# Patient Record
Sex: FEMALE | Race: WHITE | Employment: UNEMPLOYED | URBAN - METROPOLITAN AREA
[De-identification: names, ages, dates, MRNs, and addresses within clinical notes are randomized per-mention and may not be internally consistent; named-entity substitution may affect disease eponyms.]

---

## 2017-01-30 ENCOUNTER — PATIENT OUTREACH (OUTPATIENT)
Dept: FAMILY MEDICINE CLINIC | Age: 27
End: 2017-01-30

## 2017-01-30 NOTE — PROGRESS NOTES
[address]  [phone]  Fax [phone]    Patient Name: Jerrell Bennett  YOB: 1990    Patient listed on High Risk Report. Called POA, Mother, Ms Angelo Vaca who updated NN that Ms Yanci Lugo is now located at a group home called [facility] in [place], [place]. Able to confirm that patient is a resident. Left message for group home staff member, Elisabeth Montiel to return call to confirm PCP.

## 2017-03-08 ENCOUNTER — HOSPITAL ENCOUNTER (OUTPATIENT)
Dept: GENERAL RADIOLOGY | Age: 27
Discharge: HOME | End: 2017-03-08
Payer: MEDICARE

## 2017-03-08 ENCOUNTER — OFFICE VISIT (OUTPATIENT)
Dept: SURGERY | Age: 27
End: 2017-03-08

## 2017-03-08 VITALS
WEIGHT: 137 LBS | OXYGEN SATURATION: 98 % | DIASTOLIC BLOOD PRESSURE: 91 MMHG | SYSTOLIC BLOOD PRESSURE: 123 MMHG | HEART RATE: 84 BPM | BODY MASS INDEX: 23.39 KG/M2 | HEIGHT: 64 IN

## 2017-03-08 DIAGNOSIS — Z01.818 PRE-OP TESTING: ICD-10-CM

## 2017-03-08 DIAGNOSIS — G40.219 LOCALIZATION-RELATED SYMPTOMATIC EPILEPSY AND EPILEPTIC SYNDROMES WITH COMPLEX PARTIAL SEIZURES, INTRACTABLE, WITHOUT STATUS EPILEPTICUS (HCC): Primary | ICD-10-CM

## 2017-03-08 PROCEDURE — 71010 XR CHEST SNGL V: CPT

## 2017-03-08 RX ORDER — BACITRACIN 500 [USP'U]/G
OINTMENT TOPICAL 3 TIMES DAILY
COMMUNITY

## 2017-03-08 RX ORDER — DIPHENHYDRAMINE HCL 25 MG
25 TABLET ORAL
COMMUNITY

## 2017-03-08 RX ORDER — DIAZEPAM 5 MG/1
10 TABLET ORAL
COMMUNITY
End: 2019-05-29

## 2017-03-08 RX ORDER — CHOLECALCIFEROL (VITAMIN D3) 125 MCG
5000 CAPSULE ORAL DAILY
COMMUNITY

## 2017-03-08 RX ORDER — VALPROIC ACID 250 MG/1
CAPSULE, LIQUID FILLED ORAL 3 TIMES DAILY
COMMUNITY
End: 2019-05-29

## 2017-03-08 RX ORDER — AMOXICILLIN 875 MG/1
875 TABLET, FILM COATED ORAL 2 TIMES DAILY
COMMUNITY
End: 2019-05-29

## 2017-03-08 RX ORDER — AMLODIPINE BESYLATE 2.5 MG/1
2.5 TABLET ORAL DAILY
COMMUNITY

## 2017-03-08 RX ORDER — PROMETHAZINE HYDROCHLORIDE 25 MG/1
SUPPOSITORY RECTAL
COMMUNITY
End: 2019-05-29

## 2017-03-08 NOTE — PROGRESS NOTES
The patient is a 32 y.o. woman who lives in a group home and has intractable partial complex seizure disorder. She has an existing vagus nerve stimulator which is reported to have reached end of battery life. The patient presents for evaluation for replacement of vagus nerve stimulator and possibly replacement of lead. The patient is accompanied by a caretaker from her group home. The patient herself has severe intellectual disability and is not able to answer questions. The patient's reported past medical history includes arthritis, autism disorder, hypertension, ovarian cyst.  The patient has never smoked. She does not use alcohol. The patient is reported to have a gastrostomy tube in place which is for the most part not used. The patient is said to be able to eat food and drink liquid. The patient is not able to give any ROS. There is no history of heart disease, lung disease or diabetes mellitus. Physical Examination:   GENERAL:  Alert woman in a wheelchair in no acute distress. VITAL SIGNS:  BP:  122/91. P: 84.  O2 saturation 99% on room air. WT: 137 lb. HEAD/NECK:  No jaundice, mass or thyromegaly. In the left neck there is a scar which is well healed. At the level of the left anterior axillary line, there is a healed scar. LUNGS:  Clear bilaterally without wheeze, rhonchi or rales. Mariella Barrio HEART:  Regular without murmur, gallop or rub. NEURO:  Patient is alert but not able to answer questions regarding orientation. She appears to move all extremities. Facial movement is symmetrical.  She does not speak. I reviewed records from the patient's neurologist who is Dr. Alma Casas. I reviewed with the patient's caretaker who is present the indications for surgery, risks vs benefits of the surgery. I will speak to the patient's health care power of  regarding surgery, its risks and benefits in order to obtain informed consent.   I would anticipate the surgery would be done as an outpatient. Final Diagnosis:  Intractable partial complex seizure disorder. MedDATA/gwo     cc: Stacey Shaikh MD        Total Evaluation and Management time utilized (excluding any procedure time)  was 30 minutes, with 55 % in counseling and/or coordination of care.     Debbi Oconnell MD

## 2017-03-08 NOTE — MR AVS SNAPSHOT
Visit Information Date & Time Provider Department Dept. Phone Encounter #  
 3/8/2017 10:40 AM Lennox Hoist, MD [facility] [address] [phone] [id number] Upcoming Health Maintenance Date Due  
 HPV AGE 9Y-34Y (1 of 3 - Female 3 Dose Series) 10/2/2001 INFLUENZA AGE 9 TO ADULT 8/1/2016 PAP AKA CERVICAL CYTOLOGY 8/4/2017 DTaP/Tdap/Td series (2 - Td) 9/22/2024 Allergies as of 3/8/2017  Review Complete On: 3/8/2017 By: Angélica Gant LPN No Known Allergies Current Immunizations  Reviewed on 11/12/2015 Name Date Influenza Vaccine 10/9/2015, 10/17/2014 Pneumococcal Polysaccharide (PPSV-23) 11/12/2015 TB Skin Test (PPD) Intradermal 10/6/2015, 9/22/2014, 9/17/2013 Tdap 9/22/2014 Not reviewed this visit You Were Diagnosed With   
  
 Codes Comments Pre-op testing    -  Primary ICD-10-CM: M11.061 ICD-9-CM: V72.84 Vitals BP Pulse Height(growth percentile) Weight(growth percentile) SpO2 BMI  
 (!) 123/91 84 5' 4\" (1.626 m) 137 lb (62.1 kg) 98% 23.52 kg/m2 OB Status Smoking Status Injection Never Smoker BMI and BSA Data Body Mass Index Body Surface Area  
 23.52 kg/m 2 1.67 m 2 Preferred Pharmacy Pharmacy Name Phone [facility] [address] [place] [phone] Your Updated Medication List  
  
   
This list is accurate as of: 3/8/17 12:13 PM.  Always use your most recent med list.  
  
  
  
  
 acetaminophen 325 mg tablet Commonly known as:  TYLENOL Take 650 mg by mouth every four (4) hours as needed for Pain. amLODIPine 2.5 mg tablet Commonly known as:  Angie Rell Take  by mouth daily. amoxicillin 875 mg tablet Commonly known as:  AMOXIL Take 875 mg by mouth two (2) times a day. amoxicillin-pot clavula (bulk) 4:1 Powd  
by Does Not Apply route. bacitracin 500 unit/gram Oint Commonly known as:  BACITRACIN Apply  to affected area three (3) times daily. CHLORASEPTIC MAX 15-10 mg Lozg lozenge Generic drug:  benzocaine-menthol Take 1 Lozenge by mouth every two (2) hours as needed for Sore throat. clonazePAM 2 mg tablet Commonly known as:  Willeen Narrow Take 1.5 Tabs by mouth two (2) times a day. Max Daily Amount: 6 mg. At 08:00 &1600  
  
 diazePAM 5 mg tablet Commonly known as:  VALIUM Take 10 mg by mouth every six (6) hours as needed (SEIZURES). diphenhydrAMINE 25 mg tablet Commonly known as:  BENADRYL Take 25 mg by mouth every six (6) hours as needed for Sleep.  
  
 docusate sodium 100 mg capsule Commonly known as:  Esther Dilling Take 100 mg by mouth daily as needed. enoxaparin 40 mg/0.4 mL Commonly known as:  LOVENOX  
0.4 mL by SubCUTAneous route every twenty-four (24) hours. famotidine 20 mg tablet Commonly known as:  PEPCID Take 1 Tab by mouth two (2) times a day. hydroxypropyl methylcellulose 0.5 % ophthalmic solution Commonly known as:  ISOPTO TEARS Administer 1 Drop to both eyes as needed. ibuprofen 200 mg tablet Commonly known as:  MOTRIN Take 2 Tabs by mouth every six (6) hours as needed for Pain. IMODIUM A-D PO Take 2 mg by mouth as needed (2 TABLETS AFTER FIRST STOOL AND 1 TABLET AFTER NEXT BM DONT EXCEED 16 MG IN 24 HR). lacosamide 200 mg Tab tablet Commonly known as:  VIMPAT Take 1 Tab by mouth two (2) times a day. Max Daily Amount: 400 mg.  
  
 medroxyPROGESTERone 150 mg/mL Syrg Commonly known as:  DEPO-PROVERA  
use as directed at physician's office every 3 months MIDOL MAX ST MENSTRUAL 500-60-15 mg Tab Generic drug:  acetaminophen-caff-pyrilamine Take  by mouth.  
  
 modafinil 200 mg tablet Commonly known as:  PROVIGIL Take 1 Tab by mouth daily. Max Daily Amount: 200 mg. Indications: did not tolerate Ritalin. Sleepiness due to medications needed for seizure disorder nystatin powder Commonly known as:  MYCOSTATIN Apply  to affected area three (3) times daily. promethazine 25 mg suppository Commonly known as:  PHENERGAN Insert  into rectum every six (6) hours as needed for Nausea. scopolamine 1.5 mg (1 mg over 3 days) Pt3d Commonly known as:  TRANSDERM-SCOP  
1 Patch by TransDERmal route every seventy-two (72) hours. valproic acid (as sodium salt) 250 mg/5 mL (5 mL) Soln oral solution Commonly known as:  DEPAKENE  
10 mL by Per G Tube route every eight (8) hours. Indications: EPILEPSY  
  
 valproic acid 250 mg capsule Commonly known as:  Elva [place] Take  by mouth three (3) times daily. VITAMIN D3 2,000 unit Tab Generic drug:  cholecalciferol (vitamin D3) Take 5,000 Units by mouth daily. To-Do List   
 03/15/2017 Imaging:  XR CHEST SNGL V Introducing \[facility] [place] [facility]\"" & [facility] SERVICES! Dear Tonya Dunn: Thank you for requesting a [employer] account. Our records indicate that you already have an active [employer] account. You can access your account anytime at [email or web address] Did you know that you can access your hospital and ER discharge instructions at any time in [employer]? You can also review all of your test results from your hospital stay or ER visit. Additional Information If you have questions, please visit the Frequently Asked Questions section of the [employer] website at [email or web address] Remember, Transactiv is NOT to be used for urgent needs. For medical emergencies, dial 911. Now available from your iPhone and Android! Please provide this summary of care documentation to your next provider. Your primary care clinician is listed as Mayte Valencia. If you have any questions after today's visit, please call [phone].

## 2017-03-09 ENCOUNTER — TELEPHONE (OUTPATIENT)
Dept: SURGERY | Age: 27
End: 2017-03-09

## 2017-03-09 PROBLEM — G40.219 LOCALIZATION-RELATED SYMPTOMATIC EPILEPSY AND EPILEPTIC SYNDROMES WITH COMPLEX PARTIAL SEIZURES, INTRACTABLE, WITHOUT STATUS EPILEPTICUS (HCC): Status: ACTIVE | Noted: 2017-03-09

## 2017-03-15 RX ORDER — ADHESIVE BANDAGE
30 BANDAGE TOPICAL DAILY PRN
COMMUNITY

## 2017-03-15 RX ORDER — FERROUS SULFATE, DRIED 160(50) MG
1 TABLET, EXTENDED RELEASE ORAL 2 TIMES DAILY WITH MEALS
COMMUNITY
End: 2019-05-29

## 2017-03-15 NOTE — PERIOP NOTES
[facility]  Preoperative Instructions        Surgery Date 3/17/17          Time of Arrival 0930    1. On the day of your surgery, please report to the Surgical Services Registration Desk and sign in at your designated time. The Surgery Center is located to the right of the Emergency Room. 2. You must have someone with you to drive you home. You should not drive a car for 24 hours following surgery. Please make arrangements for a friend or family member to stay with you for the first 24 hours after your surgery. 3. Do not have anything to eat or drink (including water, gum, mints, coffee, juice) after midnight 3/16/17              . This may not apply to medications prescribed by your physician. Please note special instructions, if applicable. If you are currently taking Plavix, Coumadin, or other blood-thinning agents, contact your surgeon for instructions. 4. We recommend you do not drink any alcoholic beverages for 24 hours before and after your surgery. 5. Have a list of all current medications, including vitamins, herbal supplements and any other over the counter medications. Stop all Aspirin and non-steroidal anti-inflammatory drugs (I.e. Advil, Aleve), as directed by your surgeon's office. Stop all vitamins and herbal supplements seven days prior to your surgery. 6. Wear comfortable clothes. Wear glasses instead of contacts. Do not bring any money or jewelry. Please bring picture ID, insurance card, and any prearranged co-payment or hospital payment. Do not wear make-up, particularly mascara the morning of your surgery. Do not wear nail polish, particularly if you are having foot /hand surgery. Wear your hair loose or down, no ponytails, buns, jesus pins or clips. All body piercings must be removed.   Please shower with antibacterial soap for three consecutive days before and on the morning of surgery, but do not apply any lotions, powders or deodorants after the shower on the day of surgery. Please use a fresh towels after each shower. Please sleep in clean clothes and change bed linens the night before surgery. Please do not shave for 48 hours prior to surgery. Shaving of the face is acceptable. 7. You should understand that if you do not follow these instructions your surgery may be cancelled. If your physical condition changes (I.e. fever, cold or flu) please contact your surgeon as soon as possible. 8. It is important that you be on time. If a situation occurs where you may be late, please call [phone] (OR Holding Area). 9. If you have any questions and or problems, please call [phone] (Pre-admission Testing). 10. Your surgery time may be subject to change. You will receive a phone call the evening prior if your time changes. 11.  If having outpatient surgery, you must have someone to drive you here, stay with you during the duration of your stay, and to drive you home at time of discharge. Special Instructions:Valium if needed. Immodium if needed. MEDICATIONS TO TAKE THE MORNING OF SURGERY WITH A SIP OF WATER:Norvasc,Depakene, Klonopin,Pepcid, Vimpat      I understand a pre-operative phone call will be made to verify my surgery time. In the event that I am not available, I give permission for a message to be left on my answering service and/or with another person?   Yes Jaylon Query @ [facility] [phone]         ___________________      __________   _________    Alivia Tovar of Patient)             (Witness)                (Date and Time)

## 2017-03-17 ENCOUNTER — ANESTHESIA EVENT (OUTPATIENT)
Dept: SURGERY | Age: 27
End: 2017-03-17
Payer: MEDICARE

## 2017-03-17 ENCOUNTER — ANESTHESIA (OUTPATIENT)
Dept: SURGERY | Age: 27
End: 2017-03-17
Payer: MEDICARE

## 2017-03-17 ENCOUNTER — HOSPITAL ENCOUNTER (OUTPATIENT)
Age: 27
Setting detail: OUTPATIENT SURGERY
Discharge: HOME | End: 2017-03-17
Attending: SURGERY | Admitting: SURGERY
Payer: MEDICARE

## 2017-03-17 VITALS
HEIGHT: 66 IN | WEIGHT: 136.47 LBS | OXYGEN SATURATION: 100 % | DIASTOLIC BLOOD PRESSURE: 61 MMHG | SYSTOLIC BLOOD PRESSURE: 120 MMHG | BODY MASS INDEX: 21.93 KG/M2 | TEMPERATURE: 97.5 F | RESPIRATION RATE: 18 BRPM | HEART RATE: 84 BPM

## 2017-03-17 LAB — HCG UR QL: NEGATIVE

## 2017-03-17 PROCEDURE — 77030020788: Performed by: SURGERY

## 2017-03-17 PROCEDURE — 74011250636 HC RX REV CODE- 250/636: Performed by: ANESTHESIOLOGY

## 2017-03-17 PROCEDURE — 77030020256 HC SOL INJ NACL 0.9%  500ML: Performed by: SURGERY

## 2017-03-17 PROCEDURE — C1767 GENERATOR, NEURO NON-RECHARG: HCPCS | Performed by: SURGERY

## 2017-03-17 PROCEDURE — 74011000250 HC RX REV CODE- 250

## 2017-03-17 PROCEDURE — 77030032490 HC SLV COMPR SCD KNE COVD -B: Performed by: SURGERY

## 2017-03-17 PROCEDURE — 77030002933 HC SUT MCRYL J&J -A: Performed by: SURGERY

## 2017-03-17 PROCEDURE — 77030011640 HC PAD GRND REM COVD -A: Performed by: SURGERY

## 2017-03-17 PROCEDURE — 77030002996 HC SUT SLK J&J -A: Performed by: SURGERY

## 2017-03-17 PROCEDURE — 76210000006 HC OR PH I REC 0.5 TO 1 HR: Performed by: SURGERY

## 2017-03-17 PROCEDURE — 77030002986 HC SUT PROL J&J -A: Performed by: SURGERY

## 2017-03-17 PROCEDURE — 77030010507 HC ADH SKN DERMBND J&J -B: Performed by: SURGERY

## 2017-03-17 PROCEDURE — 77030008684 HC TU ET CUF COVD -B: Performed by: ANESTHESIOLOGY

## 2017-03-17 PROCEDURE — 77030008467 HC STPLR SKN COVD -B: Performed by: SURGERY

## 2017-03-17 PROCEDURE — 77030026438 HC STYL ET INTUB CARD -A: Performed by: ANESTHESIOLOGY

## 2017-03-17 PROCEDURE — 74011000250 HC RX REV CODE- 250: Performed by: SURGERY

## 2017-03-17 PROCEDURE — 76060000033 HC ANESTHESIA 1 TO 1.5 HR: Performed by: SURGERY

## 2017-03-17 PROCEDURE — 76210000020 HC REC RM PH II FIRST 0.5 HR: Performed by: SURGERY

## 2017-03-17 PROCEDURE — 74011250636 HC RX REV CODE- 250/636

## 2017-03-17 PROCEDURE — 77030020782 HC GWN BAIR PAWS FLX 3M -B

## 2017-03-17 PROCEDURE — 76010000149 HC OR TIME 1 TO 1.5 HR: Performed by: SURGERY

## 2017-03-17 PROCEDURE — 74011000272 HC RX REV CODE- 272: Performed by: SURGERY

## 2017-03-17 PROCEDURE — 81025 URINE PREGNANCY TEST: CPT

## 2017-03-17 PROCEDURE — 77030010938 HC CLP LIG TELE -A: Performed by: SURGERY

## 2017-03-17 PROCEDURE — 77030002888 HC SUT CHRMC J&J -A: Performed by: SURGERY

## 2017-03-17 DEVICE — IMPLANTABLE PULSE GENERATOR
Type: IMPLANTABLE DEVICE | Site: CHEST  WALL | Status: NON-FUNCTIONAL
Brand: VNS THERAPY® ASPIRESR® MODEL 106 GENERATOR
Removed: 2019-05-31

## 2017-03-17 RX ORDER — SUCCINYLCHOLINE CHLORIDE 20 MG/ML
INJECTION INTRAMUSCULAR; INTRAVENOUS AS NEEDED
Status: DISCONTINUED | OUTPATIENT
Start: 2017-03-17 | End: 2017-03-17 | Stop reason: HOSPADM

## 2017-03-17 RX ORDER — SODIUM CHLORIDE, SODIUM LACTATE, POTASSIUM CHLORIDE, CALCIUM CHLORIDE 600; 310; 30; 20 MG/100ML; MG/100ML; MG/100ML; MG/100ML
100 INJECTION, SOLUTION INTRAVENOUS CONTINUOUS
Status: DISCONTINUED | OUTPATIENT
Start: 2017-03-17 | End: 2017-03-17 | Stop reason: HOSPADM

## 2017-03-17 RX ORDER — ONDANSETRON 2 MG/ML
4 INJECTION INTRAMUSCULAR; INTRAVENOUS AS NEEDED
Status: DISCONTINUED | OUTPATIENT
Start: 2017-03-17 | End: 2017-03-17 | Stop reason: HOSPADM

## 2017-03-17 RX ORDER — ONDANSETRON 2 MG/ML
INJECTION INTRAMUSCULAR; INTRAVENOUS AS NEEDED
Status: DISCONTINUED | OUTPATIENT
Start: 2017-03-17 | End: 2017-03-17 | Stop reason: HOSPADM

## 2017-03-17 RX ORDER — MIDAZOLAM HYDROCHLORIDE 1 MG/ML
0.5 INJECTION, SOLUTION INTRAMUSCULAR; INTRAVENOUS
Status: DISCONTINUED | OUTPATIENT
Start: 2017-03-17 | End: 2017-03-17 | Stop reason: HOSPADM

## 2017-03-17 RX ORDER — CEFAZOLIN SODIUM IN 0.9 % NACL 2 G/100 ML
2 PLASTIC BAG, INJECTION (ML) INTRAVENOUS ONCE
Status: DISCONTINUED | OUTPATIENT
Start: 2017-03-17 | End: 2017-03-17 | Stop reason: HOSPADM

## 2017-03-17 RX ORDER — HYDROCODONE BITARTRATE AND ACETAMINOPHEN 5; 325 MG/1; MG/1
1-2 TABLET ORAL
Qty: 20 TAB | Refills: 0 | Status: SHIPPED | OUTPATIENT
Start: 2017-03-17 | End: 2019-05-29

## 2017-03-17 RX ORDER — PROPOFOL 10 MG/ML
INJECTION, EMULSION INTRAVENOUS AS NEEDED
Status: DISCONTINUED | OUTPATIENT
Start: 2017-03-17 | End: 2017-03-17 | Stop reason: HOSPADM

## 2017-03-17 RX ORDER — SODIUM CHLORIDE, SODIUM LACTATE, POTASSIUM CHLORIDE, CALCIUM CHLORIDE 600; 310; 30; 20 MG/100ML; MG/100ML; MG/100ML; MG/100ML
25 INJECTION, SOLUTION INTRAVENOUS CONTINUOUS
Status: DISCONTINUED | OUTPATIENT
Start: 2017-03-17 | End: 2017-03-17 | Stop reason: HOSPADM

## 2017-03-17 RX ORDER — LIDOCAINE HYDROCHLORIDE 10 MG/ML
0.1 INJECTION, SOLUTION EPIDURAL; INFILTRATION; INTRACAUDAL; PERINEURAL AS NEEDED
Status: DISCONTINUED | OUTPATIENT
Start: 2017-03-17 | End: 2017-03-17 | Stop reason: HOSPADM

## 2017-03-17 RX ORDER — FENTANYL CITRATE 50 UG/ML
INJECTION, SOLUTION INTRAMUSCULAR; INTRAVENOUS AS NEEDED
Status: DISCONTINUED | OUTPATIENT
Start: 2017-03-17 | End: 2017-03-17 | Stop reason: HOSPADM

## 2017-03-17 RX ORDER — FENTANYL CITRATE 50 UG/ML
25 INJECTION, SOLUTION INTRAMUSCULAR; INTRAVENOUS
Status: DISCONTINUED | OUTPATIENT
Start: 2017-03-17 | End: 2017-03-17 | Stop reason: HOSPADM

## 2017-03-17 RX ORDER — MIDAZOLAM HYDROCHLORIDE 1 MG/ML
1 INJECTION, SOLUTION INTRAMUSCULAR; INTRAVENOUS AS NEEDED
Status: DISCONTINUED | OUTPATIENT
Start: 2017-03-17 | End: 2017-03-17 | Stop reason: HOSPADM

## 2017-03-17 RX ORDER — LIDOCAINE HYDROCHLORIDE 20 MG/ML
INJECTION, SOLUTION EPIDURAL; INFILTRATION; INTRACAUDAL; PERINEURAL AS NEEDED
Status: DISCONTINUED | OUTPATIENT
Start: 2017-03-17 | End: 2017-03-17 | Stop reason: HOSPADM

## 2017-03-17 RX ORDER — MIDAZOLAM HYDROCHLORIDE 1 MG/ML
INJECTION, SOLUTION INTRAMUSCULAR; INTRAVENOUS AS NEEDED
Status: DISCONTINUED | OUTPATIENT
Start: 2017-03-17 | End: 2017-03-17 | Stop reason: HOSPADM

## 2017-03-17 RX ORDER — HYDROCODONE BITARTRATE AND ACETAMINOPHEN 5; 325 MG/1; MG/1
1 TABLET ORAL AS NEEDED
Status: DISCONTINUED | OUTPATIENT
Start: 2017-03-17 | End: 2017-03-17 | Stop reason: HOSPADM

## 2017-03-17 RX ORDER — HYDROMORPHONE HYDROCHLORIDE 1 MG/ML
0.5 INJECTION, SOLUTION INTRAMUSCULAR; INTRAVENOUS; SUBCUTANEOUS
Status: DISCONTINUED | OUTPATIENT
Start: 2017-03-17 | End: 2017-03-17 | Stop reason: HOSPADM

## 2017-03-17 RX ORDER — DIPHENHYDRAMINE HYDROCHLORIDE 50 MG/ML
12.5 INJECTION, SOLUTION INTRAMUSCULAR; INTRAVENOUS AS NEEDED
Status: DISCONTINUED | OUTPATIENT
Start: 2017-03-17 | End: 2017-03-17 | Stop reason: HOSPADM

## 2017-03-17 RX ORDER — FENTANYL CITRATE 50 UG/ML
50 INJECTION, SOLUTION INTRAMUSCULAR; INTRAVENOUS AS NEEDED
Status: DISCONTINUED | OUTPATIENT
Start: 2017-03-17 | End: 2017-03-17 | Stop reason: HOSPADM

## 2017-03-17 RX ADMIN — LIDOCAINE HYDROCHLORIDE 40 MG: 20 INJECTION, SOLUTION EPIDURAL; INFILTRATION; INTRACAUDAL; PERINEURAL at 13:00

## 2017-03-17 RX ADMIN — FENTANYL CITRATE 50 MCG: 50 INJECTION, SOLUTION INTRAMUSCULAR; INTRAVENOUS at 13:00

## 2017-03-17 RX ADMIN — MIDAZOLAM HYDROCHLORIDE 1 MG: 1 INJECTION, SOLUTION INTRAMUSCULAR; INTRAVENOUS at 12:47

## 2017-03-17 RX ADMIN — SUCCINYLCHOLINE CHLORIDE 100 MG: 20 INJECTION INTRAMUSCULAR; INTRAVENOUS at 13:00

## 2017-03-17 RX ADMIN — ONDANSETRON 4 MG: 2 INJECTION INTRAMUSCULAR; INTRAVENOUS at 13:44

## 2017-03-17 RX ADMIN — SODIUM CHLORIDE, SODIUM LACTATE, POTASSIUM CHLORIDE, AND CALCIUM CHLORIDE 25 ML/HR: 600; 310; 30; 20 INJECTION, SOLUTION INTRAVENOUS at 10:57

## 2017-03-17 RX ADMIN — PROPOFOL 180 MG: 10 INJECTION, EMULSION INTRAVENOUS at 13:00

## 2017-03-17 NOTE — ANESTHESIA POSTPROCEDURE EVALUATION
Post-Anesthesia Evaluation and Assessment    Patient: Dereje Aragon MRN: [id number]  SSN: [id number]    YOB: 1990  Age: 32 y.o. Sex: female       Cardiovascular Function/Vital Signs  Visit Vitals    /69 (BP 1 Location: Left arm, BP Patient Position: At rest)    Pulse 83    Temp 36.5 °C (97.7 °F)    Resp 18    Ht 5' 6\" (1.676 m)    Wt 61.9 kg (136 lb 7.4 oz)    SpO2 100%    BMI 22.03 kg/m2       Patient is status post general anesthesia for Procedure(s):  REPLACE VAGUS NERVE STIMULATOR GENERATOR AND POSSIBLE LEAD. Nausea/Vomiting: None    Postoperative hydration reviewed and adequate. Pain:  Pain Scale 1: Visual (03/17/17 1450)  Pain Intensity 1: 0 (03/17/17 1450)   Managed    Neurological Status:   Neuro (WDL): Exceptions to WDL (03/17/17 1423)  Neuro  Neurologic State: Drowsy; Confused (03/17/17 1423)  Orientation Level: Unable to verbalize (03/17/17 1423)  Cognition: Decreased attention/concentration (03/17/17 1423)  Speech:  (only states \"no\") (03/17/17 1423)  LUE Motor Response: Purposeful (03/17/17 1423)  LLE Motor Response: Weak (03/17/17 1423)  RUE Motor Response: Purposeful (03/17/17 1423)  RLE Motor Response: Weak (03/17/17 1423)   At baseline    Mental Status and Level of Consciousness: Arousable    Pulmonary Status:   O2 Device: Room air (03/17/17 1450)   Adequate oxygenation and airway patent    Complications related to anesthesia: None    Post-anesthesia assessment completed.  No concerns    Signed By: Mukesh Stein MD     March 17, 2017

## 2017-03-17 NOTE — IP AVS SNAPSHOT
[address] [facility] 
[phone] Patient: Vito Araiza MRN: [id number] :1990 You are allergic to the following No active allergies Recent Documentation Height Weight BMI OB Status Smoking Status 1.676 m 61.9 kg 22.03 kg/m2 Injection Never Smoker Emergency Contacts Name Discharge Info Relation Home Work Mobile Jazzmine Benavides DISCHARGE CAREGIVER [3] Other Relative [6] [phone] Kathi Allen  Parent [1] [phone] About your hospitalization You were admitted on:  2017 You last received care in the:  [facility] PACU You were discharged on:  2017 Unit phone number:  [phone] Why you were hospitalized Your primary diagnosis was:  Not on File Providers Seen During Your Hospitalizations Provider Role Specialty Primary office phone Michelle Gaxiola MD Attending Provider General Surgery [phone] Your Primary Care Physician (PCP) Primary Care Physician Office Phone Office Fax Ashley Analilia [phone] Follow-up Information Follow up With Details Comments Contact Info MD Reta FloydKindred Healthcareangel  [address] [place] [address] 
[phone] Current Discharge Medication List  
  
START taking these medications Dose & Instructions Dispensing Information Comments Morning Noon Evening Bedtime HYDROcodone-acetaminophen 5-325 mg per tablet Commonly known as:  Errol Dolphiludy Your last dose was: Your next dose is:    
   
   
 Dose:  1-2 Tab Take 1-2 Tabs by mouth every four (4) hours as needed for Pain. Max Daily Amount: 12 Tabs. Quantity:  20 Tab Refills:  0 CONTINUE these medications which have CHANGED Dose & Instructions Dispensing Information Comments Morning Noon Evening Bedtime lacosamide 200 mg Tab tablet Commonly known as:  VIMPAT What changed:  how much to take Your last dose was: Your next dose is:    
   
   
 Dose:  200 mg Take 1 Tab by mouth two (2) times a day. Max Daily Amount: 400 mg. Quantity:  60 Tab Refills:  0 CONTINUE these medications which have NOT CHANGED Dose & Instructions Dispensing Information Comments Morning Noon Evening Bedtime  
 acetaminophen 325 mg tablet Commonly known as:  TYLENOL Your last dose was: Your next dose is:    
   
   
 Dose:  650 mg Take 650 mg by mouth every four (4) hours as needed for Pain. Refills:  0  
     
   
   
   
  
 amLODIPine 2.5 mg tablet Commonly known as:  Jazzy Colon Your last dose was: Your next dose is:    
   
   
 Dose:  2.5 mg Take 2.5 mg by mouth daily. Indications: morning Refills:  0  
     
   
   
   
  
 amoxicillin 875 mg tablet Commonly known as:  AMOXIL Your last dose was: Your next dose is:    
   
   
 Dose:  875 mg Take 875 mg by mouth two (2) times a day. Refills:  0  
     
   
   
   
  
 amoxicillin-pot clavula (bulk) 4:1 Powd Your last dose was: Your next dose is:    
   
   
 by Does Not Apply route. Refills:  0  
     
   
   
   
  
 bacitracin 500 unit/gram Oint Commonly known as:  BACITRACIN Your last dose was: Your next dose is:    
   
   
 Apply  to affected area three (3) times daily. Refills:  0  
     
   
   
   
  
 calcium-vitamin D 500 mg(1,250mg) -200 unit per tablet Commonly known as:  OYSTER SHELL Your last dose was: Your next dose is:    
   
   
 Dose:  1 Tab Take 1 Tab by mouth two (2) times daily (with meals). Refills:  0  
     
   
   
   
  
 CHLORASEPTIC MAX 15-10 mg Lozg lozenge Generic drug:  benzocaine-menthol Your last dose was: Your next dose is:    
   
   
 Dose:  1 Lozenge Take 1 Lozenge by mouth every two (2) hours as needed for Sore throat. Refills:  0  
     
   
   
   
  
 clonazePAM 2 mg tablet Commonly known as:  Jacqueline Pelt Your last dose was: Your next dose is:    
   
   
 Dose:  3 mg Take 1.5 Tabs by mouth two (2) times a day. Max Daily Amount: 6 mg. At 08:00 &1600 Quantity:  45 Tab Refills:  0  
     
   
   
   
  
 diazePAM 5 mg tablet Commonly known as:  VALIUM Your last dose was: Your next dose is:    
   
   
 Dose:  10 mg Take 10 mg by mouth every six (6) hours as needed (SEIZURES). Refills:  0  
     
   
   
   
  
 diphenhydrAMINE 25 mg tablet Commonly known as:  BENADRYL Your last dose was: Your next dose is:    
   
   
 Dose:  25 mg Take 25 mg by mouth every six (6) hours as needed for Sleep. Refills:  0  
     
   
   
   
  
 docusate sodium 100 mg capsule Commonly known as:  Melvenia Clipper Your last dose was: Your next dose is:    
   
   
 Dose:  100 mg Take 100 mg by mouth daily as needed. Refills:  0  
     
   
   
   
  
 enoxaparin 40 mg/0.4 mL Commonly known as:  LOVENOX Your last dose was: Your next dose is:    
   
   
 Dose:  40 mg  
0.4 mL by SubCUTAneous route every twenty-four (24) hours. Quantity:  30 Syringe Refills:  0  
     
   
   
   
  
 famotidine 20 mg tablet Commonly known as:  PEPCID Your last dose was: Your next dose is:    
   
   
 Dose:  20 mg Take 1 Tab by mouth two (2) times a day. Quantity:  60 Tab Refills:  0 FERROUS SULFATE (BULK) Your last dose was: Your next dose is:    
   
   
 Dose:  5 mL  
5 mL by Does Not Apply route daily. Refills:  0 IMODIUM A-D PO Your last dose was: Your next dose is:    
   
   
 Dose:  2 mg Take 2 mg by mouth as needed (2 TABLETS AFTER FIRST STOOL AND 1 TABLET AFTER NEXT BM DONT EXCEED 16 MG IN 24 HR). Refills:  0  
     
   
   
   
  
 medroxyPROGESTERone 150 mg/mL Syrg Commonly known as:  DEPO-PROVERA Your last dose was: Your next dose is:    
   
   
 use as directed at physician's office every 3 months Quantity:  1 mL Refills:  3 MIDOL MAX ST MENSTRUAL 500-60-15 mg Tab Generic drug:  acetaminophen-caff-pyrilamine Your last dose was: Your next dose is: Take  by mouth. Refills:  0 VUONG MILK OF MAGNESIA 400 mg/5 mL suspension Generic drug:  magnesium hydroxide Your last dose was: Your next dose is:    
   
   
 Dose:  30 mL Take 30 mL by mouth daily as needed for Constipation. Refills:  0  
     
   
   
   
  
 promethazine 25 mg suppository Commonly known as:  PHENERGAN Your last dose was: Your next dose is: Insert  into rectum every six (6) hours as needed for Nausea. Refills:  0  
     
   
   
   
  
 valproic acid (as sodium salt) 250 mg/5 mL (5 mL) Soln oral solution Commonly known as:  Theadore Fridge Your last dose was: Your next dose is:    
   
   
 Dose:  500 mg  
10 mL by Per G Tube route every eight (8) hours. Indications: EPILEPSY Quantity:  900 mL Refills:  0  
     
   
   
   
  
 valproic acid 250 mg capsule Commonly known as:  Theadore Fridge Your last dose was: Your next dose is: Take  by mouth three (3) times daily. Refills:  0  
     
   
   
   
  
 VITAMIN D3 2,000 unit Tab Generic drug:  cholecalciferol (vitamin D3) Your last dose was: Your next dose is:    
   
   
 Dose:  5000 Units Take 5,000 Units by mouth daily. Refills:  0 ASK your doctor about these medications Dose & Instructions Dispensing Information Comments Morning Noon Evening Bedtime  
 ibuprofen 200 mg tablet Commonly known as:  MOTRIN Your last dose was: Your next dose is:    
   
   
 Dose:  400 mg Take 2 Tabs by mouth every six (6) hours as needed for Pain. Quantity:  120 Tab Refills:  2 Where to Get Your Medications Information on where to get these meds will be given to you by the nurse or doctor. ! Ask your nurse or doctor about these medications HYDROcodone-acetaminophen 5-325 mg per tablet Discharge Instructions Discharge Instructions Following Replacement of Vagus Nerve Stimulator Keep incision dry for one day, then OK to shower. Leave Dermabond (plastic coating) in place on incision until it falls off. No lifting over 15 pounds or other vigorous exertion with the left arm for two weeks. Take pain medicines as prescribed as needed for pain. See Dr Jesus Brice in two weeks in the office for follow up. Call for appointment  - [phone] The Vagus Nerve Stimulator has been turned on to previous settings. If any problems, call Dr. Jesus Brice at [phone] or [phone] (after hours). King Collins MD 
 
 
 
DISCHARGE SUMMARY from Nurse The following personal items are in your possession at time of discharge: 
 
Dental Appliances: None Visual Aid: None Hearing Aids/Status: Does not own Home Medications: None Jewelry: With patient, Bracelet (stimulator band) Clothing: Undergarments, With patient (home clothing) Other Valuables: None, Wheelchair Personal Items Sent to Safe: no valuables to send with security PATIENT INSTRUCTIONS: 
 
After general anesthesia or intravenous sedation, for 24 hours or while taking prescription Narcotics: · Limit your activities · Do not drive and operate hazardous machinery · Do not make important personal or business decisions · Do  not drink alcoholic beverages · If you have not urinated within 8 hours after discharge, please contact your surgeon on call. Report the following to your surgeon: 
· Excessive pain, swelling, redness or odor of or around the surgical area · Temperature over 100.5 · Nausea and vomiting lasting longer than 4 hours or if unable to take medications · Any signs of decreased circulation or nerve impairment to extremity: change in color, persistent  numbness, tingling, coldness or increase pain · Any questions These are general instructions for a healthy lifestyle: No smoking/ No tobacco products/ Avoid exposure to second hand smoke Surgeon General's Warning:  Quitting smoking now greatly reduces serious risk to your health. Obesity, smoking, and sedentary lifestyle greatly increases your risk for illness A healthy diet, regular physical exercise & weight monitoring are important for maintaining a healthy lifestyle You may be retaining fluid if you have a history of heart failure or if you experience any of the following symptoms:  Weight gain of 3 pounds or more overnight or 5 pounds in a week, increased swelling in our hands or feet or shortness of breath while lying flat in bed. Please call your doctor as soon as you notice any of these symptoms; do not wait until your next office visit. Recognize signs and symptoms of STROKE: 
 
F-face looks uneven A-arms unable to move or move unevenly S-speech slurred or non-existent T-time-call 911 as soon as signs and symptoms begin-DO NOT go Back to bed or wait to see if you get better-TIME IS BRAIN. Warning Signs of HEART ATTACK Call 911 if you have these symptoms: 
? Chest discomfort. Most heart attacks involve discomfort in the center of the chest that lasts more than a few minutes, or that goes away and comes back. It can feel like uncomfortable pressure, squeezing, fullness, or pain. ? Discomfort in other areas of the upper body.  Symptoms can include pain or discomfort in one or both arms, the back, neck, jaw, or stomach. ? Shortness of breath with or without chest discomfort. ? Other signs may include breaking out in a cold sweat, nausea, or lightheadedness. Don't wait more than five minutes to call [address]! Fast action can save your life. Calling 911 is almost always the fastest way to get lifesaving treatment. Emergency Medical Services staff can begin treatment when they arrive  up to an hour sooner than if someone gets to the hospital by car. Hydrocodone/Acetaminophen (By mouth) Acetaminophen (m-nxrp-l-MIN-oh-fen), Hydrocodone Bitartrate (njo-uguk-EKA-done bye-TAR-trate) Treats pain. This medicine contains a narcotic pain reliever. Brand Name(s):Hycet, Lorcet, Lorcet HD, Lorcet Plus, Lortab 10/325, Lortab 5/325, Lortab 7.5/325, Lortab Elixir, Norco, Verdrocet, Vicodin, Vicodin ES, Vicodin HP, Danielle Boy 5/300 There may be other brand names for this medicine. When This Medicine Should Not Be Used: This medicine is not right for everyone. Do not use it if you had an allergic reaction to acetaminophen, hydrocodone, or other narcotic medicines. How to Use This Medicine:  
Tablet, Liquid, Capsule · Your doctor will tell you how much medicine to use. Do not use more than directed. · Measure the oral liquid medicine with a marked measuring spoon, oral syringe, or medicine cup. · Drink plenty of liquids to help avoid constipation. · Missed dose: Take a dose as soon as you remember. If it is almost time for your next dose, wait until then and take a regular dose. Do not take extra medicine to make up for a missed dose. · Store the medicine in a closed container at room temperature, away from heat, moisture, and direct light. Drugs and Foods to Avoid: Ask your doctor or pharmacist before using any other medicine, including over-the-counter medicines, vitamins, and herbal products. · Some medicines can affect how hydrocodone/acetaminophen works. Tell your doctor if you are using any of the following: ¨ An MAO inhibitor ¨ Medicine to treat depression · This medicine can intensify the effects of alcohol, sedatives, or tranquilizers. Tell your doctor if you drink alcohol or if you are using any medicine that makes you sleepy, such as allergy medicine or another narcotic pain medicine. Acetaminophen can damage your liver, and your risk is higher if you also drink alcohol. Warnings While Using This Medicine: · Tell your doctor if you are pregnant or breastfeeding, or if you have lung disease, liver disease, kidney disease, problems urinating, an underactive thyroid, [place] disease, prostate problems, stomach problems, or a history of head injury or brain tumor. · This medicine may cause the following problems:  
¨ Liver problems ¨ Serious skin reactions · This medicine can be habit-forming. Do not use more than your prescribed dose. Call your doctor if you think your medicine is not working. · This medicine may make you dizzy or drowsy. Do not drive or doing anything else that could be dangerous until you know how this medicine affects you. · Too much of this medicine can cause death. Symptoms of an overdose include extreme dizziness or weakness, trouble breathing, slow heartbeat, seizure, and cold, clammy skin. · This medicine contains acetaminophen. Read the labels of all other medicines you are using to see if they also contain acetaminophen, or ask your doctor or pharmacist. Reilly Mcgowan not use more than 4 grams (4,000 milligrams) total of acetaminophen in one day. · Tell any doctor or dentist who treats you that you are using this medicine. This medicine may affect certain medical test results. · This medicine may cause constipation, especially with long-term use. Ask your doctor if you should use a laxative to prevent and treat constipation. · Keep all medicine out of the reach of children. Never share your medicine with anyone. Possible Side Effects While Using This Medicine:  
Call your doctor right away if you notice any of these side effects: · Allergic reaction: Itching or hives, swelling in your face or hands, swelling or tingling in your mouth or throat, chest tightness, trouble breathing · Blistering, peeling, red skin rash · Change in how much or how often you urinate · Dark urine or pale stools, loss of appetite, stomach pain, yellow skin or eyes · Extreme weakness, shallow breathing, slow heartbeat, sweating, cold or clammy skin · Lightheadedness, dizziness, fainting · Unusual bleeding or bruising If you notice these less serious side effects, talk with your doctor: · Constipation, nausea, vomiting · Tiredness or sleepiness If you notice other side effects that you think are caused by this medicine, tell your doctor. Call your doctor for medical advice about side effects. You may report side effects to FDA at 6-281-FDA-1778 © 2016 [address] is for End User's use only and may not be sold, redistributed or otherwise used for commercial purposes. The above information is an  only. It is not intended as medical advice for individual conditions or treatments. Talk to your doctor, nurse or pharmacist before following any medical regimen to see if it is safe and effective for you. Discharge Orders None ACO Transitions of Care Introducing [facility] [employer] offers a voluntary care coordination program to provide high quality service and care to [facility] fee-for-service beneficiaries. William Daigle was designed to help you enhance your health and well-being through the following services: ? Transitions of Care  support for individuals who are transitioning from one care setting to another (example: Hospital to home). ? Chronic and Complex Care Coordination  support for individuals and caregivers of those with serious or chronic illnesses or with more than one chronic (ongoing) condition and those who take a number of different medications. If you meet specific medical criteria, a [facility] [address] may call you directly to coordinate your care with your primary care physician and your other care providers. For questions about the [facility] programs, please, contact your physicians office. For general questions or additional information about Accountable Care Organizations: 
Please visit www.medicare.gov/acos. html or call 1-800-MEDICARE (9-319.634.1601) TTY users should call 2-824.297.8725. Introducing [facility]! Dear Sharad Gonsales: Thank you for requesting a [employer] account. Our records indicate that you already have an active [employer] account. You can access your account anytime at [email or web address] [employer] Did you know that you can access your hospital and ER discharge instructions at any time in [employer]? You can also review all of your test results from your hospital stay or ER visit. Additional Information If you have questions, please visit the Frequently Asked Questions section of the [employer] website at [email or web address]/. Remember, [employer] is NOT to be used for urgent needs. For medical emergencies, dial 911. Now available from your iPhone and Android! General Information Please provide this summary of care documentation to your next provider. Patient Signature:  ____________________________________________________________ Date:  ____________________________________________________________  
  
Fiordaliza Gladys Provider Signature:  ____________________________________________________________ Date:  ____________________________________________________________

## 2017-03-17 NOTE — PERIOP NOTES
Handoff Report from Operating Room to PACU    Report received from MAGUI Sorto RN and DOV Camacho regarding Neri Newman. Surgeon(s):  Susu Draper MD  And Procedure(s) (LRB):  REPLACE VAGUS NERVE STIMULATOR GENERATOR AND POSSIBLE LEAD (Left)  confirmed   with allergies and dressings discussed. Anesthesia type, drugs, patient history, complications, estimated blood loss, vital signs, intake and output, and last pain medication, lines and temperature were reviewed.

## 2017-03-17 NOTE — OP NOTES
Operative Report    CPT 82595, 42036        Replacement of Vagus Nerve Stimulator Generator (model 106); Electronic Testing and Programming        Patient:  Doni Alex    Date of Surgery:  3/17/2017    Preoperative diagnosis: Dysfunction of Vagus Nerve Stimulator Generator, Refractory Seizure Disorder    Postoperative Diagnosis: Same    Anesthesia:  General    Surgeon:  Ronal Madsen MD    Assistant: staff     Operative Summary:     The patient was taken to the operating room and placed on the operating table in the supine position with the neck extended and rotated to the right. The patient's left neck and chest were prepared and sterilely draped. An [place] drape was utilized. An incision was made along the left anterior axillary line about 5 cm in length through the previous surgical scar. .  The incision was carried down to the capsule surrounding the VNS generator. The capsule was opened and the generator brought out after division of the prolene fixation suture. The set screw was loosened and the old generator removed. A new vagus nerve stimulator generator (model 106) was then attached to the lead with the set screw and the device was interrogated electronically with the generator outside of the patient. A lead test was performed; readings were normal.  The generator was then placed in the pocket and attached to the pectoralis fascia with a 4-0 prolene. A second interrogation and lead test were  performed, which gave normal readings, and then the generator was reset to the patient's previous settings and the device turned on. Heart rate sensing testing was normal.     The subcutaneous tissue at the pectoral site was closed with 3-0 chromic and the skin with 4-0 Vicryl intracuticular suture. Dermabond was applied to the incision. The patient tolerated the procedure well and was taken to the Recovery Room in stable condition.       Specimen - none    Drain - none    Estimated blood loss - minimal    Complications - none      MELINDA Leslie MD

## 2017-03-17 NOTE — BRIEF OP NOTE
BRIEF OPERATIVE NOTE    Date of Procedure: 3/17/2017   Preoperative Diagnosis: INTRACTABLE SEIZURES  Postoperative Diagnosis: INTRACTABLE SEIZURES    Procedure(s):  REPLACE VAGUS NERVE STIMULATOR GENERATOR, ELECTRONIC TESTING AND PROGRAMMING  Surgeon(s) and Role:     * Reece Appiah MD - Primary            Surgical Staff:  Circ-1: Chip Ramirez RN  Circ-Relief: Lashell Townsend RN  Scrub Tech-1: Ira Burdock  Surg Asst-1: Brionna Molina  Surg Asst-Relief: Tana Thakkar RN  Event Time In   Incision Start 1322   Incision Close      Anesthesia: General   Estimated Blood Loss: MINIMAL  Specimens: * No specimens in log *   Findings: Testing confirmed new generator (MODEL 106) and existing lead functioning properly. New generator programmed to patient's prior settings. Device is turned on. Complications: none  Implants:   Implant Name Type Inv.  Item Serial No.  Lot No. LRB No. Used Action   GENERATOR VAGUS NRV STIM 14ML -- SGL PIN ASPIRESR - SNA   GENERATOR VAGUS NRV STIM 14ML -- SGL PIN ASPIRESR NA P[place]ieve [id number] Left 1 Implanted

## 2017-03-17 NOTE — H&P
Surgery    The patient was seen and examined on 3/17/2017. There were no changes from the office History and Physical of 3/8/2017. That note is as follows: The patient is a 32 y.o. woman who lives in a group home and has intractable partial complex seizure disorder. She has an existing vagus nerve stimulator which is reported to have reached end of battery life. The patient presents for evaluation for replacement of vagus nerve stimulator and possibly replacement of lead.      The patient is accompanied by a caretaker from her group home.      The patient herself has severe intellectual disability and is not able to answer questions.      The patient's reported past medical history includes arthritis, autism disorder, hypertension, ovarian cyst. The patient has never smoked. She does not use alcohol. The patient is reported to have a gastrostomy tube in place which is for the most part not used. The patient is said to be able to eat food and drink liquid.      The patient is not able to give any ROS. There is no history of heart disease, lung disease or diabetes mellitus.     Physical Examination:   GENERAL: Alert woman in a wheelchair in no acute distress. VITAL SIGNS: BP: 122/91. P: 84. O2 saturation 99% on room air. WT: 137 lb. HEAD/NECK: No jaundice, mass or thyromegaly. In the left neck there is a scar which is well healed. At the level of the left anterior axillary line, there is a healed scar. LUNGS: Clear bilaterally without wheeze, rhonchi or rales. Ruma Po HEART: Regular without murmur, gallop or rub. NEURO: Patient is alert but not able to answer questions regarding orientation. She appears to move all extremities.  Facial movement is symmetrical. She does not speak.      I reviewed records from the patient's neurologist who is Dr. Nataila Ibarra.      I reviewed with the patient's caretaker who is present the indications for surgery, risks vs benefits of the surgery.     I will speak to the patient's health care power of  regarding surgery, its risks and benefits in order to obtain informed consent. I would anticipate the surgery would be done as an outpatient.     Final Diagnosis: Intractable partial complex seizure disorder.         MELINDA Gabriel MD

## 2017-03-17 NOTE — DISCHARGE INSTRUCTIONS
Discharge Instructions Following Replacement of Vagus Nerve Stimulator        Keep incision dry for one day, then OK to shower. Leave Dermabond (plastic coating) in place on incision until it falls off. No lifting over 15 pounds or other vigorous exertion with the left arm for two weeks. Take pain medicines as prescribed as needed for pain. See Dr Florentin Villafana in two weeks in the office for follow up. Call for appointment  - [phone]    The Vagus Nerve Stimulator has been turned on to previous settings. If any problems, call Dr. Florentin Villafana at [phone] or [phone] (after hours). Carmelo David MD        DISCHARGE SUMMARY from Nurse    The following personal items are in your possession at time of discharge:    Dental Appliances: None  Visual Aid: None  Hearing Aids/Status: Does not own  Home Medications: None  Jewelry: With patient, Bracelet (stimulator band)  Clothing: Undergarments, With patient (home clothing)  Other Valuables: None, Wheelchair  Personal Items Sent to Safe: no valuables to send with security          PATIENT INSTRUCTIONS:    After general anesthesia or intravenous sedation, for 24 hours or while taking prescription Narcotics:  · Limit your activities  · Do not drive and operate hazardous machinery  · Do not make important personal or business decisions  · Do  not drink alcoholic beverages  · If you have not urinated within 8 hours after discharge, please contact your surgeon on call.     Report the following to your surgeon:  · Excessive pain, swelling, redness or odor of or around the surgical area  · Temperature over 100.5  · Nausea and vomiting lasting longer than 4 hours or if unable to take medications  · Any signs of decreased circulation or nerve impairment to extremity: change in color, persistent  numbness, tingling, coldness or increase pain  · Any questions        These are general instructions for a healthy lifestyle:    No smoking/ No tobacco products/ Avoid exposure to second hand smoke    Surgeon General's Warning:  Quitting smoking now greatly reduces serious risk to your health. Obesity, smoking, and sedentary lifestyle greatly increases your risk for illness    A healthy diet, regular physical exercise & weight monitoring are important for maintaining a healthy lifestyle    You may be retaining fluid if you have a history of heart failure or if you experience any of the following symptoms:  Weight gain of 3 pounds or more overnight or 5 pounds in a week, increased swelling in our hands or feet or shortness of breath while lying flat in bed. Please call your doctor as soon as you notice any of these symptoms; do not wait until your next office visit. Recognize signs and symptoms of STROKE:    F-face looks uneven    A-arms unable to move or move unevenly    S-speech slurred or non-existent    T-time-call 911 as soon as signs and symptoms begin-DO NOT go       Back to bed or wait to see if you get better-TIME IS BRAIN. Warning Signs of HEART ATTACK     Call 911 if you have these symptoms:   Chest discomfort. Most heart attacks involve discomfort in the center of the chest that lasts more than a few minutes, or that goes away and comes back. It can feel like uncomfortable pressure, squeezing, fullness, or pain.  Discomfort in other areas of the upper body. Symptoms can include pain or discomfort in one or both arms, the back, neck, jaw, or stomach.  Shortness of breath with or without chest discomfort.  Other signs may include breaking out in a cold sweat, nausea, or lightheadedness. Don't wait more than five minutes to call 911 - MINUTES MATTER! Fast action can save your life. Calling 911 is almost always the fastest way to get lifesaving treatment. Emergency Medical Services staff can begin treatment when they arrive -- up to an hour sooner than if someone gets to the hospital by car.      Hydrocodone/Acetaminophen (By mouth)   Acetaminophen (n-unlp-d-MIN-oh-fen), Hydrocodone Bitartrate (lmz-hwpg-SVT-done bye-TAR-trate)  Treats pain. This medicine contains a narcotic pain reliever. Brand Name(s):Hycet, Lorcet, Lorcet HD, Lorcet Plus, Lortab 10/325, Lortab 5/325, Lortab 7.5/325, Lortab Elixir, Norco, Verdrocet, Vicodin, Vicodin ES, Vicodin HP, Xodol, Xodol 5/300   There may be other brand names for this medicine. When This Medicine Should Not Be Used: This medicine is not right for everyone. Do not use it if you had an allergic reaction to acetaminophen, hydrocodone, or other narcotic medicines. How to Use This Medicine:   Tablet, Liquid, Capsule  · Your doctor will tell you how much medicine to use. Do not use more than directed. · Measure the oral liquid medicine with a marked measuring spoon, oral syringe, or medicine cup. · Drink plenty of liquids to help avoid constipation. · Missed dose: Take a dose as soon as you remember. If it is almost time for your next dose, wait until then and take a regular dose. Do not take extra medicine to make up for a missed dose. · Store the medicine in a closed container at room temperature, away from heat, moisture, and direct light. Drugs and Foods to Avoid:   Ask your doctor or pharmacist before using any other medicine, including over-the-counter medicines, vitamins, and herbal products. · Some medicines can affect how hydrocodone/acetaminophen works. Tell your doctor if you are using any of the following:   ¨ An MAO inhibitor  ¨ Medicine to treat depression  · This medicine can intensify the effects of alcohol, sedatives, or tranquilizers. Tell your doctor if you drink alcohol or if you are using any medicine that makes you sleepy, such as allergy medicine or another narcotic pain medicine. Acetaminophen can damage your liver, and your risk is higher if you also drink alcohol.   Warnings While Using This Medicine:   · Tell your doctor if you are pregnant or breastfeeding, or if you have lung disease, liver disease, kidney disease, problems urinating, an underactive thyroid, [place] disease, prostate problems, stomach problems, or a history of head injury or brain tumor. · This medicine may cause the following problems:   ¨ Liver problems  ¨ Serious skin reactions  · This medicine can be habit-forming. Do not use more than your prescribed dose. Call your doctor if you think your medicine is not working. · This medicine may make you dizzy or drowsy. Do not drive or doing anything else that could be dangerous until you know how this medicine affects you. · Too much of this medicine can cause death. Symptoms of an overdose include extreme dizziness or weakness, trouble breathing, slow heartbeat, seizure, and cold, clammy skin. · This medicine contains acetaminophen. Read the labels of all other medicines you are using to see if they also contain acetaminophen, or ask your doctor or pharmacist. [facility] not use more than 4 grams (4,000 milligrams) total of acetaminophen in one day. · Tell any doctor or dentist who treats you that you are using this medicine. This medicine may affect certain medical test results. · This medicine may cause constipation, especially with long-term use. Ask your doctor if you should use a laxative to prevent and treat constipation. · Keep all medicine out of the reach of children. Never share your medicine with anyone.   Possible Side Effects While Using This Medicine:   Call your doctor right away if you notice any of these side effects:  · Allergic reaction: Itching or hives, swelling in your face or hands, swelling or tingling in your mouth or throat, chest tightness, trouble breathing  · Blistering, peeling, red skin rash  · Change in how much or how often you urinate  · Dark urine or pale stools, loss of appetite, stomach pain, yellow skin or eyes  · Extreme weakness, shallow breathing, slow heartbeat, sweating, cold or clammy skin  · Lightheadedness, dizziness, fainting  · Unusual bleeding or bruising  If you notice these less serious side effects, talk with your doctor:   · Constipation, nausea, vomiting  · Tiredness or sleepiness  If you notice other side effects that you think are caused by this medicine, tell your doctor. Call your doctor for medical advice about side effects. You may report side effects to FDA at 8-059-DOS-3619  © 2016 [address] is for End User's use only and may not be sold, redistributed or otherwise used for commercial purposes. The above information is an  only. It is not intended as medical advice for individual conditions or treatments. Talk to your doctor, nurse or pharmacist before following any medical regimen to see if it is safe and effective for you.

## 2017-03-17 NOTE — PERIOP NOTES
Discharge instructions reviewed with caregiver, caregiver verbalizes understanding of information provided.

## 2017-03-17 NOTE — ANESTHESIA PREPROCEDURE EVALUATION
Anesthetic History   No history of anesthetic complications            Review of Systems / Medical History  Patient summary reviewed, nursing notes reviewed and pertinent labs reviewed    Pulmonary  Within defined limits              Comments: Respiratory failure   Neuro/Psych     seizures: poorly controlled    Neuromuscular disease     Cardiovascular    Hypertension                   GI/Hepatic/Renal  Within defined limits              Endo/Other  Within defined limits           Other Findings   Comments:  Anorexia (R63.0)         Encephalopathy (G93.40)        Intellectual disability (F79)        Autistic disorder (F84.0)        Dehydration (E86.0)        Vasospasm (HCC) (I73.9)        Ovarian cyst (N83.20)        Ill-defined condition (R69)  autism      Hypertension (I10)        Dental caries (K02.9)        Epilepsy ([facility] [address] (G40.909)  last sz nov 3,2015      Arthritis (M19.90)        Chronic mental illness (F99)        Status post VNS (vagus nerve stimulator) placement (Z98.89)        Muscle weakness (M62.81)        Status epilepticus (HCC) (G40.301)              Physical Exam    Airway             Cardiovascular    Rhythm: regular  Rate: normal         Dental         Pulmonary      Decreased breath sounds           Abdominal         Other Findings            Anesthetic Plan    ASA: 3  Anesthesia type: general    Monitoring Plan: BIS      Induction: Intravenous  Anesthetic plan and risks discussed with: Patient      Cindy

## 2017-03-17 NOTE — PERIOP NOTES
11: 00= protective padding placed on both sides of stretcher. 11:05= caregiver left with belongings. 11:15= pt pulling on IV tape and tubing; will stay at bedside with pt until she goes to surgery. 11:30= attached fior paws device to gown for warmth. 12:24= pt resting comfortably in bed with eyes closed.

## 2017-03-20 NOTE — WOUND CARE
Pressure Ulcer Prevention In basket Alert Received for Preston < 14 (moderate risk).      Suggested Care Plan/Interventions for Nursing  1. Complete Preston Pressure Ulcer Risk Scale and use sub scores to identify appropriate interventions. 2. Perform Assessment: skin, changes in LOC, visual cues for pain, monitor skin under medical devices  3. Respond to Reduced Sensory Perception: changes in LOC, check visual cues for pain, float heels, suspension boots, pressure redistribution bed/mattress/chair cushion, turning and reposition approximately every 2 hours (pillows & wedges), pad between skin to skin, turn & reposition  4. Manage Moisture: absorbent under pads, internal / external urinary device, internal /  external fecal device, minimize layers, contain wound drainage, access need for specialty bed, limit adult briefs, maintain skin hydration (lotion/cream), moisture barrier, offer toileting every hour  5. Promote Activity: increase time out of bed, chair cushion, PT/OT evaluation, trapeze to reposition, pressure redistribution bed/mattress/chair  6. Address Reduced Mobility: float heels / suspension boot, HOB 30 degrees or less, pressure redistribution bed/mattress/cushion, PT / OT evaluation, turn and reposition approximately every 2 hours (pillows & wedges)  7. Promote Nutrition: document food / fluid / supplement intake, encourage/assist with meals as needed  8. Reduce Friction and Shear: transferring/repositioning devices (lift/draw sheet), lift team/ patient mobility team, feet elevated on foot rest, minimize layers, foam dressing / transparent film / skin sealants, protective barrier creams and emollients, transfer aides (board, Molly lift, ceiling lift, stand assist), HOB 30 degrees or less, trapeze to reposition.   Wound Care Team

## 2019-04-30 ENCOUNTER — TELEPHONE (OUTPATIENT)
Dept: SURGERY | Age: 29
End: 2019-04-30

## 2019-04-30 DIAGNOSIS — G40.219 LOCALIZATION-RELATED SYMPTOMATIC EPILEPSY AND EPILEPTIC SYNDROMES WITH COMPLEX PARTIAL SEIZURES, INTRACTABLE, WITHOUT STATUS EPILEPTICUS (HCC): Primary | ICD-10-CM

## 2019-04-30 NOTE — TELEPHONE ENCOUNTER
Spoke with Ms Heriberto Grimm from group home. Appt made for Ms Bradly Stein to see Dr Jose Campbell on 5/16/19 for replacement of VNS generator. Pt will have CXR done same day, prior to office visit. Order placed & signed.

## 2019-05-16 ENCOUNTER — HOSPITAL ENCOUNTER (OUTPATIENT)
Dept: GENERAL RADIOLOGY | Age: 29
Discharge: HOME | End: 2019-05-16
Payer: MEDICARE

## 2019-05-16 ENCOUNTER — OFFICE VISIT (OUTPATIENT)
Dept: SURGERY | Age: 29
End: 2019-05-16

## 2019-05-16 VITALS
DIASTOLIC BLOOD PRESSURE: 84 MMHG | HEIGHT: 66 IN | BODY MASS INDEX: 22.02 KG/M2 | SYSTOLIC BLOOD PRESSURE: 120 MMHG | WEIGHT: 137 LBS | HEART RATE: 89 BPM

## 2019-05-16 DIAGNOSIS — G40.219 LOCALIZATION-RELATED SYMPTOMATIC EPILEPSY AND EPILEPTIC SYNDROMES WITH COMPLEX PARTIAL SEIZURES, INTRACTABLE, WITHOUT STATUS EPILEPTICUS (HCC): Primary | ICD-10-CM

## 2019-05-16 DIAGNOSIS — G40.219 LOCALIZATION-RELATED SYMPTOMATIC EPILEPSY AND EPILEPTIC SYNDROMES WITH COMPLEX PARTIAL SEIZURES, INTRACTABLE, WITHOUT STATUS EPILEPTICUS (HCC): ICD-10-CM

## 2019-05-16 PROCEDURE — 71045 X-RAY EXAM CHEST 1 VIEW: CPT

## 2019-05-16 RX ORDER — LORATADINE 10 MG/1
10 TABLET ORAL
COMMUNITY
Start: 2019-05-01

## 2019-05-16 RX ORDER — CLOBAZAM 2.5 MG/ML
2.5 SUSPENSION ORAL 2 TIMES DAILY
COMMUNITY
Start: 2019-04-15

## 2019-05-16 RX ORDER — HYDROCORTISONE 1 %
CREAM (GRAM) TOPICAL
COMMUNITY

## 2019-05-16 RX ORDER — FACIAL-BODY WIPES
10 EACH TOPICAL DAILY
COMMUNITY

## 2019-05-16 RX ORDER — POLYETHYLENE GLYCOL 3350 17 G/17G
17 POWDER, FOR SOLUTION ORAL DAILY
COMMUNITY

## 2019-05-16 NOTE — PROGRESS NOTES
Chief Complaint   Patient presents with    Seizure     Eval VNS GENERATOR REPLACEMENT      Discussed advanced directive. Patient states that she does not have an advanced directive.

## 2019-05-16 NOTE — PROGRESS NOTES
The patient is a 32 y.o. woman who lives in a group home and has intractable partial complex seizure disorder. She has an existing vagus nerve stimulator which is reported to have reached end of battery life. The patient presents for evaluation for replacement of vagus nerve stimulator and possibly replacement of lead. The patient had replacement of vagus nerve stimulator generator (to 106 model) in 2017. She is reported to have a high setting on the generator.     The patient is accompanied by a caretaker from her group home.       The patient herself has severe intellectual disability and is not able to answer questions.       The patient's reported past medical history includes arthritis, autism disorder, hypertension, ovarian cyst.  The patient has never smoked. She does not use alcohol. The patient is reported to have a gastrostomy tube in place which is for the most part not used. The patient is said to be able to eat food and drink liquid.       The patient is not able to give any ROS. There is no history of heart disease, lung disease or diabetes mellitus.     Physical Examination:   GENERAL:  Alert woman in a wheelchair in no acute distress. HEAD/NECK:  No jaundice, mass or thyromegaly. In the left neck there is a scar which is well healed. At the level of the left anterior axillary line, there is a healed scar. LUNGS:  Clear bilaterally without wheeze, rhonchi or rales. Ruma Po HEART:  Regular without murmur, gallop or rub. NEURO:  Patient is alert but not able to answer questions regarding orientation. She appears to move all extremities. Facial movement is symmetrical.  She does not speak.      I reviewed a CXR  (images) taken today.     I reviewed with the patient's caretaker who is present the indications for surgery, risks vs benefits of the surgery.     I will speak to the patient's health care power of  regarding surgery, its risks and benefits in order to obtain informed consent.   I would anticipate the surgery would be done as an outpatient.     Final Diagnosis:  Intractable partial complex seizure disorder          cc: Suad Piña MD

## 2019-05-24 NOTE — PERIOP NOTES
Left vm for Moncho Quick; recd all orders,etc. Does she want us to wait to do assessment until Dr. Eric Jennings has spoken w [facility] ?

## 2019-05-28 ENCOUNTER — TELEPHONE (OUTPATIENT)
Dept: SURGERY | Age: 29
End: 2019-05-28

## 2019-05-28 NOTE — TELEPHONE ENCOUNTER
Surgery    I spoke to the patient's mother Zackery Vaughn regarding the planned surgery for 5/31/2019 - Replacement of vagus nerve stimulator generator and possible replacement of lead under general anesthesia. Risks and benefits reviewed. Risks include bleeding, infection failure of the device, injury to adjacent structures. If dysfunction of the lead were found and the lead was replaced, that would require reoperating on the neck with risk of injury to carotid artery, jugular vein, vagus nerve, other cranial and cervical nerves, etc.  Risks associated with general anesthesia were reviewed. The patient's mother understands and wishes to proceed. The patient herself is not able to give consent because of her mental status.     Kathy Snow MD

## 2019-05-28 NOTE — PERIOP NOTES
Erika Spencer at Dr. Rosita Benitez, states that Dr. Jesus Brice will call [facility] this afternoon and that PAT nurse can go ahead and call [facility] and group home to obtain pre-op information per Dr. Jesus Brice.

## 2019-05-29 RX ORDER — MAGNESIUM CITRATE
296 SOLUTION, ORAL ORAL AS NEEDED
COMMUNITY

## 2019-05-29 RX ORDER — GUAIFENESIN/DEXTROMETHORPHAN 100-10MG/5
5 SYRUP ORAL
COMMUNITY

## 2019-05-29 RX ORDER — VALPROIC ACID 250 MG/5ML
500 SOLUTION ORAL
COMMUNITY

## 2019-05-29 RX ORDER — MAG HYDROX/ALUMINUM HYD/SIMETH 200-200-20
30 SUSPENSION, ORAL (FINAL DOSE FORM) ORAL
COMMUNITY

## 2019-05-29 RX ORDER — DIAZEPAM 10 MG/2ML
10 GEL RECTAL AS NEEDED
COMMUNITY

## 2019-05-29 RX ORDER — ACETAMINOPHEN 500 MG
1000 TABLET ORAL
COMMUNITY

## 2019-05-29 NOTE — PERIOP NOTES
[facility]  Preoperative Instructions        Surgery Date 5/31/2019          Time of Arrival 0730    1. On the day of your surgery, please report to the Surgical Services Registration Desk and sign in at your designated time. The Surgery Center is located to the right of the Emergency Room. 2. You must have someone with you to drive you home. You should not drive a car for 24 hours following surgery. Please make arrangements for a friend or family member to stay with you for the first 24 hours after your surgery. 3. Do not have anything to eat or drink (including water, gum, mints, coffee, juice) after midnight 5/30/2019. ? This may not apply to medications prescribed by your physician. ?(Please note below the special instructions with medications to take the morning of your procedure.)    4. We recommend you do not drink any alcoholic beverages for 24 hours before and after your surgery. 5. Contact your surgeons office for instructions on the following medications: non-steroidal anti-inflammatory drugs (i.e. Advil, Aleve), vitamins, and supplements. (Some surgeons will want you to stop these medications prior to surgery and others may allow you to take them)  **If you are currently taking Plavix, Coumadin, Aspirin and/or other blood-thinning agents, contact your surgeon for instructions. ** Your surgeon will partner with the physician prescribing these medications to determine if it is safe to stop or if you need to continue taking. Please do not stop taking these medications without instructions from your surgeon    6. Wear comfortable clothes. Wear glasses instead of contacts. Do not bring any money or jewelry. Please bring picture ID, insurance card, and any prearranged co-payment or hospital payment. Do not wear make-up, particularly mascara the morning of your surgery. Do not wear nail polish, particularly if you are having foot /hand surgery.   Wear your hair loose or down, no ponytails, buns, jesus pins or clips. All body piercings must be removed. Please shower with antibacterial soap for three consecutive days before and on the morning of surgery, but do not apply any lotions, powders or deodorants after the shower on the day of surgery. Please use a fresh towels after each shower. Please sleep in clean clothes and change bed linens the night before surgery. Please do not shave for 48 hours prior to surgery. Shaving of the face is acceptable. 7. You should understand that if you do not follow these instructions your surgery may be cancelled. If your physical condition changes (I.e. fever, cold or flu) please contact your surgeon as soon as possible. 8. It is important that you be on time. If a situation occurs where you may be late, please call [phone] (OR Holding Area). 9. If you have any questions and or problems, please call [phone] (Pre-admission Testing). 10. Your surgery time may be subject to change. You will receive a phone call the evening prior if your time changes. 11.  If having outpatient surgery, you must have someone to drive you here, stay with you during the duration of your stay, and to drive you home at time of discharge. 12.   In an effort to improve the efficiency, privacy, and safety for all of our Pre-op patients visitors are not allowed in the Holding area. Once you arrive and are registered your family/visitors will be asked to remain in the waiting room. The Pre-op staff will get you from the Surgical Waiting Area and will explain to you and your family/visitors that the Pre-op phase is beginning. The staff will answer any questions and provide instructions for tracking of the patient, by use of the existing tracking number and color-coded status board in the waiting room.   At this time the staff will also ask for your designated spokesperson information in the event that the physician or staff need to provide an update or obtain any pertinent information. The designated spokesperson will be notified if the physician needs to speak to family during the pre-operative phase. If at any time your family/visitors has questions or concerns they may approach the volunteer desk in the waiting area for assistance. Special Instructions: Please bring wheelchair, G-Tube supplies and Remote for Nerve Stimulator. MEDICATIONS TO TAKE THE MORNING OF SURGERY WITH A SIP OF WATER:  Amlodipine, Clobazam, Clonazepam, Famotidine, Valproic Acid, Vimpat, Acetaminophen, Anti-diarrheal (Loperamide), Diazepam, Loratadine, Benadryl, MI acid susp (Mylanta), Sore Throat Spray    I understand a pre-operative phone call will be made to verify my surgery time. In the event that I am not available, I give permission for a message to be left on my answering service and/or with another person?   Yes Alvino Ruiz, Medical Manager for The [address] [place] [address],  [phone]         ___________________      __________   _________    Roxanna Sifuentes of Patient)             (Witness)                (Date and Time)

## 2019-05-29 NOTE — PERIOP NOTES
Received pt medication list via fax from Anjali Alfaro, Medical Mgr at The [address] [place] [address]. [phone], (fax# [phone]). Wayne Salinas states that the [address] [place] [address] has authority to sign patient consents for medical procedures and will fax the legal forms to PAT. Pre-op Instructions faxed to [phone], not a working fax#.     1048: Sp/w Andi Longoria Oldman/mother (POA). Verified pt by using two identifiers. Verbal Consent completed by SAMEER Fernández RN and MARGARITA Iglesias per MD order. 1100: TCT: The [address] [place] [address], , requested different fax#, Pre-op Instructions faxed to [phone].    1106: TCT: Main OR, sp/w Roma. Notified pt on Contact Precautions/CRE. 1118: Notified Infection Control/[place] pt with CRE hx.    1134: fax confirmation received for [facility]/Pre-op Instructions.

## 2019-05-30 NOTE — PERIOP NOTES
Oswaldo from the [address] called to inform that pt had UTI reported from PCP office. Pt to be treated with Macrobid per PCP starting 5/31/19 post procedure. Also spoke with Jhonny Giordano in Dr. Naz Momin office who is also aware of UTI. PCP and number provided to Jhonny Giordano for further questions or treatment from Dr. Miguel Grant.

## 2019-05-31 ENCOUNTER — HOSPITAL ENCOUNTER (OUTPATIENT)
Age: 29
Setting detail: OUTPATIENT SURGERY
Discharge: HOME | End: 2019-05-31
Attending: SURGERY | Admitting: SURGERY
Payer: MEDICARE

## 2019-05-31 ENCOUNTER — ANESTHESIA (OUTPATIENT)
Dept: SURGERY | Age: 29
End: 2019-05-31
Payer: MEDICARE

## 2019-05-31 ENCOUNTER — ANESTHESIA EVENT (OUTPATIENT)
Dept: SURGERY | Age: 29
End: 2019-05-31
Payer: MEDICARE

## 2019-05-31 VITALS
HEART RATE: 97 BPM | DIASTOLIC BLOOD PRESSURE: 86 MMHG | RESPIRATION RATE: 16 BRPM | HEIGHT: 64 IN | SYSTOLIC BLOOD PRESSURE: 116 MMHG | BODY MASS INDEX: 25.86 KG/M2 | WEIGHT: 151.46 LBS | OXYGEN SATURATION: 97 % | TEMPERATURE: 96.9 F

## 2019-05-31 DIAGNOSIS — L76.82 PAIN AT SURGICAL INCISION: Primary | ICD-10-CM

## 2019-05-31 PROCEDURE — 77030002888 HC SUT CHRMC J&J -A: Performed by: SURGERY

## 2019-05-31 PROCEDURE — 77030032490 HC SLV COMPR SCD KNE COVD -B: Performed by: SURGERY

## 2019-05-31 PROCEDURE — 77030011640 HC PAD GRND REM COVD -A: Performed by: SURGERY

## 2019-05-31 PROCEDURE — 76060000034 HC ANESTHESIA 1.5 TO 2 HR: Performed by: SURGERY

## 2019-05-31 PROCEDURE — 77030013079 HC BLNKT BAIR HGGR 3M -A: Performed by: ANESTHESIOLOGY

## 2019-05-31 PROCEDURE — 77030018673: Performed by: SURGERY

## 2019-05-31 PROCEDURE — 77030008684 HC TU ET CUF COVD -B: Performed by: ANESTHESIOLOGY

## 2019-05-31 PROCEDURE — 74011250636 HC RX REV CODE- 250/636: Performed by: ANESTHESIOLOGY

## 2019-05-31 PROCEDURE — 77030020256 HC SOL INJ NACL 0.9%  500ML: Performed by: SURGERY

## 2019-05-31 PROCEDURE — 76210000000 HC OR PH I REC 2 TO 2.5 HR: Performed by: SURGERY

## 2019-05-31 PROCEDURE — 77030002933 HC SUT MCRYL J&J -A: Performed by: SURGERY

## 2019-05-31 PROCEDURE — 77030019908 HC STETH ESOPH SIMS -A: Performed by: ANESTHESIOLOGY

## 2019-05-31 PROCEDURE — 74011000272 HC RX REV CODE- 272: Performed by: SURGERY

## 2019-05-31 PROCEDURE — 74011000250 HC RX REV CODE- 250

## 2019-05-31 PROCEDURE — 77030020782 HC GWN BAIR PAWS FLX 3M -B

## 2019-05-31 PROCEDURE — 74011250636 HC RX REV CODE- 250/636

## 2019-05-31 PROCEDURE — 77030026438 HC STYL ET INTUB CARD -A: Performed by: ANESTHESIOLOGY

## 2019-05-31 PROCEDURE — 74011000250 HC RX REV CODE- 250: Performed by: SURGERY

## 2019-05-31 PROCEDURE — 77030002986 HC SUT PROL J&J -A: Performed by: SURGERY

## 2019-05-31 PROCEDURE — C1767 GENERATOR, NEURO NON-RECHARG: HCPCS | Performed by: SURGERY

## 2019-05-31 PROCEDURE — 77030039266 HC ADH SKN EXOFIN S2SG -A: Performed by: SURGERY

## 2019-05-31 PROCEDURE — 76210000020 HC REC RM PH II FIRST 0.5 HR: Performed by: SURGERY

## 2019-05-31 PROCEDURE — 76010000153 HC OR TIME 1.5 TO 2 HR: Performed by: SURGERY

## 2019-05-31 PROCEDURE — 74011250636 HC RX REV CODE- 250/636: Performed by: SURGERY

## 2019-05-31 DEVICE — IMPLANTABLE PULSE GENERATOR
Type: IMPLANTABLE DEVICE | Site: CHEST  WALL | Status: FUNCTIONAL
Brand: VNS THERAPY® SENTIVA™ MODEL 1000 GENERATOR

## 2019-05-31 RX ORDER — SODIUM CHLORIDE 0.9 % (FLUSH) 0.9 %
5-40 SYRINGE (ML) INJECTION AS NEEDED
Status: DISCONTINUED | OUTPATIENT
Start: 2019-05-31 | End: 2019-05-31 | Stop reason: HOSPADM

## 2019-05-31 RX ORDER — NALOXONE HYDROCHLORIDE 4 MG/.1ML
SPRAY NASAL
Qty: 2 EACH | Refills: 0 | Status: SHIPPED | OUTPATIENT
Start: 2019-05-31

## 2019-05-31 RX ORDER — SODIUM CHLORIDE, SODIUM LACTATE, POTASSIUM CHLORIDE, CALCIUM CHLORIDE 600; 310; 30; 20 MG/100ML; MG/100ML; MG/100ML; MG/100ML
25 INJECTION, SOLUTION INTRAVENOUS CONTINUOUS
Status: DISCONTINUED | OUTPATIENT
Start: 2019-05-31 | End: 2019-05-31 | Stop reason: HOSPADM

## 2019-05-31 RX ORDER — OXYCODONE AND ACETAMINOPHEN 5; 325 MG/1; MG/1
1 TABLET ORAL AS NEEDED
Status: DISCONTINUED | OUTPATIENT
Start: 2019-05-31 | End: 2019-05-31 | Stop reason: HOSPADM

## 2019-05-31 RX ORDER — ONDANSETRON 2 MG/ML
4 INJECTION INTRAMUSCULAR; INTRAVENOUS AS NEEDED
Status: DISCONTINUED | OUTPATIENT
Start: 2019-05-31 | End: 2019-05-31 | Stop reason: HOSPADM

## 2019-05-31 RX ORDER — CEFAZOLIN SODIUM/WATER 2 G/20 ML
2 SYRINGE (ML) INTRAVENOUS ONCE
Status: COMPLETED | OUTPATIENT
Start: 2019-05-31 | End: 2019-05-31

## 2019-05-31 RX ORDER — ONDANSETRON 2 MG/ML
INJECTION INTRAMUSCULAR; INTRAVENOUS AS NEEDED
Status: DISCONTINUED | OUTPATIENT
Start: 2019-05-31 | End: 2019-05-31 | Stop reason: HOSPADM

## 2019-05-31 RX ORDER — MIDAZOLAM HYDROCHLORIDE 1 MG/ML
0.5 INJECTION, SOLUTION INTRAMUSCULAR; INTRAVENOUS
Status: DISCONTINUED | OUTPATIENT
Start: 2019-05-31 | End: 2019-05-31 | Stop reason: HOSPADM

## 2019-05-31 RX ORDER — PROPOFOL 10 MG/ML
INJECTION, EMULSION INTRAVENOUS AS NEEDED
Status: DISCONTINUED | OUTPATIENT
Start: 2019-05-31 | End: 2019-05-31 | Stop reason: HOSPADM

## 2019-05-31 RX ORDER — SUCCINYLCHOLINE CHLORIDE 20 MG/ML
INJECTION INTRAMUSCULAR; INTRAVENOUS AS NEEDED
Status: DISCONTINUED | OUTPATIENT
Start: 2019-05-31 | End: 2019-05-31 | Stop reason: HOSPADM

## 2019-05-31 RX ORDER — LIDOCAINE HYDROCHLORIDE 20 MG/ML
INJECTION, SOLUTION EPIDURAL; INFILTRATION; INTRACAUDAL; PERINEURAL AS NEEDED
Status: DISCONTINUED | OUTPATIENT
Start: 2019-05-31 | End: 2019-05-31 | Stop reason: HOSPADM

## 2019-05-31 RX ORDER — SODIUM CHLORIDE 9 MG/ML
50 INJECTION, SOLUTION INTRAVENOUS CONTINUOUS
Status: DISCONTINUED | OUTPATIENT
Start: 2019-05-31 | End: 2019-05-31 | Stop reason: HOSPADM

## 2019-05-31 RX ORDER — ROCURONIUM BROMIDE 10 MG/ML
INJECTION, SOLUTION INTRAVENOUS AS NEEDED
Status: DISCONTINUED | OUTPATIENT
Start: 2019-05-31 | End: 2019-05-31 | Stop reason: HOSPADM

## 2019-05-31 RX ORDER — DEXAMETHASONE SODIUM PHOSPHATE 4 MG/ML
INJECTION, SOLUTION INTRA-ARTICULAR; INTRALESIONAL; INTRAMUSCULAR; INTRAVENOUS; SOFT TISSUE AS NEEDED
Status: DISCONTINUED | OUTPATIENT
Start: 2019-05-31 | End: 2019-05-31 | Stop reason: HOSPADM

## 2019-05-31 RX ORDER — SODIUM CHLORIDE, SODIUM LACTATE, POTASSIUM CHLORIDE, CALCIUM CHLORIDE 600; 310; 30; 20 MG/100ML; MG/100ML; MG/100ML; MG/100ML
75 INJECTION, SOLUTION INTRAVENOUS CONTINUOUS
Status: DISCONTINUED | OUTPATIENT
Start: 2019-05-31 | End: 2019-05-31 | Stop reason: HOSPADM

## 2019-05-31 RX ORDER — DIPHENHYDRAMINE HYDROCHLORIDE 50 MG/ML
12.5 INJECTION, SOLUTION INTRAMUSCULAR; INTRAVENOUS AS NEEDED
Status: DISCONTINUED | OUTPATIENT
Start: 2019-05-31 | End: 2019-05-31 | Stop reason: HOSPADM

## 2019-05-31 RX ORDER — FENTANYL CITRATE 50 UG/ML
INJECTION, SOLUTION INTRAMUSCULAR; INTRAVENOUS AS NEEDED
Status: DISCONTINUED | OUTPATIENT
Start: 2019-05-31 | End: 2019-05-31 | Stop reason: HOSPADM

## 2019-05-31 RX ORDER — SODIUM CHLORIDE 0.9 % (FLUSH) 0.9 %
5-40 SYRINGE (ML) INJECTION EVERY 8 HOURS
Status: DISCONTINUED | OUTPATIENT
Start: 2019-05-31 | End: 2019-05-31 | Stop reason: HOSPADM

## 2019-05-31 RX ORDER — MIDAZOLAM HYDROCHLORIDE 1 MG/ML
1 INJECTION, SOLUTION INTRAMUSCULAR; INTRAVENOUS AS NEEDED
Status: DISCONTINUED | OUTPATIENT
Start: 2019-05-31 | End: 2019-05-31 | Stop reason: HOSPADM

## 2019-05-31 RX ORDER — MORPHINE SULFATE 10 MG/ML
2 INJECTION, SOLUTION INTRAMUSCULAR; INTRAVENOUS
Status: DISCONTINUED | OUTPATIENT
Start: 2019-05-31 | End: 2019-05-31 | Stop reason: HOSPADM

## 2019-05-31 RX ORDER — FENTANYL CITRATE 50 UG/ML
25 INJECTION, SOLUTION INTRAMUSCULAR; INTRAVENOUS
Status: DISCONTINUED | OUTPATIENT
Start: 2019-05-31 | End: 2019-05-31 | Stop reason: HOSPADM

## 2019-05-31 RX ORDER — LIDOCAINE HYDROCHLORIDE 10 MG/ML
0.1 INJECTION, SOLUTION EPIDURAL; INFILTRATION; INTRACAUDAL; PERINEURAL AS NEEDED
Status: DISCONTINUED | OUTPATIENT
Start: 2019-05-31 | End: 2019-05-31 | Stop reason: HOSPADM

## 2019-05-31 RX ORDER — FENTANYL CITRATE 50 UG/ML
50 INJECTION, SOLUTION INTRAMUSCULAR; INTRAVENOUS AS NEEDED
Status: DISCONTINUED | OUTPATIENT
Start: 2019-05-31 | End: 2019-05-31 | Stop reason: HOSPADM

## 2019-05-31 RX ORDER — MIDAZOLAM HYDROCHLORIDE 1 MG/ML
INJECTION, SOLUTION INTRAMUSCULAR; INTRAVENOUS AS NEEDED
Status: DISCONTINUED | OUTPATIENT
Start: 2019-05-31 | End: 2019-05-31 | Stop reason: HOSPADM

## 2019-05-31 RX ORDER — HYDROMORPHONE HYDROCHLORIDE 1 MG/ML
0.2 INJECTION, SOLUTION INTRAMUSCULAR; INTRAVENOUS; SUBCUTANEOUS
Status: DISCONTINUED | OUTPATIENT
Start: 2019-05-31 | End: 2019-05-31 | Stop reason: HOSPADM

## 2019-05-31 RX ORDER — TRAMADOL HYDROCHLORIDE 50 MG/1
50 TABLET ORAL
Qty: 10 TAB | Refills: 0 | Status: SHIPPED | OUTPATIENT
Start: 2019-05-31 | End: 2019-06-03

## 2019-05-31 RX ADMIN — DEXAMETHASONE SODIUM PHOSPHATE 4 MG: 4 INJECTION, SOLUTION INTRA-ARTICULAR; INTRALESIONAL; INTRAMUSCULAR; INTRAVENOUS; SOFT TISSUE at 09:22

## 2019-05-31 RX ADMIN — SUCCINYLCHOLINE CHLORIDE 100 MG: 20 INJECTION INTRAMUSCULAR; INTRAVENOUS at 09:08

## 2019-05-31 RX ADMIN — SODIUM CHLORIDE, SODIUM LACTATE, POTASSIUM CHLORIDE, AND CALCIUM CHLORIDE 25 ML/HR: 600; 310; 30; 20 INJECTION, SOLUTION INTRAVENOUS at 08:45

## 2019-05-31 RX ADMIN — PROPOFOL 130 MG: 10 INJECTION, EMULSION INTRAVENOUS at 09:06

## 2019-05-31 RX ADMIN — LIDOCAINE HYDROCHLORIDE 100 MG: 20 INJECTION, SOLUTION EPIDURAL; INFILTRATION; INTRACAUDAL; PERINEURAL at 09:06

## 2019-05-31 RX ADMIN — Medication 2 G: at 09:15

## 2019-05-31 RX ADMIN — SODIUM CHLORIDE, SODIUM LACTATE, POTASSIUM CHLORIDE, AND CALCIUM CHLORIDE: 600; 310; 30; 20 INJECTION, SOLUTION INTRAVENOUS at 08:51

## 2019-05-31 RX ADMIN — FENTANYL CITRATE 50 MCG: 50 INJECTION, SOLUTION INTRAMUSCULAR; INTRAVENOUS at 09:08

## 2019-05-31 RX ADMIN — ONDANSETRON 4 MG: 2 INJECTION INTRAMUSCULAR; INTRAVENOUS at 10:10

## 2019-05-31 RX ADMIN — MIDAZOLAM HYDROCHLORIDE 2 MG: 1 INJECTION, SOLUTION INTRAMUSCULAR; INTRAVENOUS at 08:57

## 2019-05-31 RX ADMIN — ROCURONIUM BROMIDE 5 MG: 10 INJECTION, SOLUTION INTRAVENOUS at 09:07

## 2019-05-31 RX ADMIN — FENTANYL CITRATE 25 MCG: 50 INJECTION, SOLUTION INTRAMUSCULAR; INTRAVENOUS at 09:39

## 2019-05-31 RX ADMIN — FENTANYL CITRATE 25 MCG: 50 INJECTION, SOLUTION INTRAMUSCULAR; INTRAVENOUS at 09:36

## 2019-05-31 NOTE — ANESTHESIA POSTPROCEDURE EVALUATION
Procedure(s):  REPLACEMENT OF BENJY NERVE STIMULATOR, GENERATOR. general    Anesthesia Post Evaluation        Patient location during evaluation: PACU  Patient participation: complete - patient participated  Post-procedure mental status: baseline.   Pain management: adequate  Airway patency: patent  Anesthetic complications: no  Cardiovascular status: acceptable  Respiratory status: acceptable  Hydration status: acceptable  Comments: Seen and preparing for discharge  Post anesthesia nausea and vomiting:  none      Vitals Value Taken Time   /69 5/31/2019 12:45 PM   Temp 36.1 °C (96.9 °F) 5/31/2019 12:48 PM   Pulse 86 5/31/2019 12:45 PM   Resp 20 5/31/2019 12:45 PM   SpO2 98 % 5/31/2019 12:45 PM

## 2019-05-31 NOTE — PROGRESS NOTES
Pt arrived via w/c w/pt. , Gris Gates at bedside. 0815-listed am meds given by caregiver via g-tube.

## 2019-05-31 NOTE — ROUTINE PROCESS
Patient: Mamta Leon MRN: [id number]  SSN: [id number]   YOB: 1990  Age: 29 y.o. Sex: female     Patient is status post Procedure(s):  REPLACEMENT OF BENJY NERVE STIMULATOR, GENERATOR.     Surgeon(s) and Role:     * Kaykay Crespo MD - Primary                 Peripheral IV 05/31/19 Right Wrist (Active)   Site Assessment Clean, dry, & intact 5/31/2019  8:45 AM   Phlebitis Assessment 0 5/31/2019  8:45 AM   Infiltration Assessment 0 5/31/2019  8:45 AM   Dressing Status New drainage;Dry 5/31/2019  8:45 AM   Dressing Type Tape 5/31/2019  8:45 AM   Hub Color/Line Status Blue 5/31/2019  8:45 AM            Airway - Endotracheal Tube 05/31/19 Oral (Active)   Line Ramo Lips 5/31/2019 12:00 AM                   Dressing/Packing:  Wound Axilla Left-Dressing Type: Topical skin adhesive/glue (05/31/19 0947)    Splint/Cast:  ]

## 2019-05-31 NOTE — PERIOP NOTES
Handoff Report from Operating Room to PACU    Report received from [address] and [address] regarding Mamta Co. Surgeon(s):  Kaykay Crespo MD  And Procedure(s) (LRB):  REPLACEMENT OF BENJY NERVE STIMULATOR, GENERATOR (Left)  confirmed   with allergies and dressings discussed. Anesthesia type, drugs, patient history, complications, estimated blood loss, vital signs, intake and output, and last pain medication, lines and temperature were reviewed. 1310: Discharge instructions reviewed with patient and caregiver. Both parties were given opportunities to ask questions and voice concerns. Both parties denied any further questions or concerns at the time of discharge. Patient A&O x4. Respirations even, unlabored. Skin warm, dry; no drainage from surgical site noted at the time of discharge. Patient dressed, given belongings and escorted to car via wheelchair. PIV Removed.

## 2019-05-31 NOTE — PROGRESS NOTES
Surgery    I spoke with patient's caregiver post op. I attempted to call patient's family member Susie Alasludyeusebio but was forwarded to a full voicemail box.     Lucy Gill MD

## 2019-05-31 NOTE — DISCHARGE INSTRUCTIONS
Discharge Instructions Following Replacement of Vagus Nerve Stimulator        Keep incision dry for one day, then OK to shower. Leave Exofin (plastic coating) in place on incision until it falls off. No lifting over 10 pounds or other vigorous exertion with the left arm for two weeks. Take pain medicines as prescribed as needed for pain or use Tylenol. Minimize use of narcotics (tramadol). Use Narcan nasal spray for the patient as directed if needed to reverse slowed breathing or inability to wake up when taking narcotic medication. .   See Dr Ursula Montoya in two weeks in the office for follow up. Call for appointment  - [phone]    The Vagus Nerve Stimulator has been turned on to previous settings. If any problems, call Dr. Ursula Montoya at [phone] or [phone] (after hours). Kailee Green MD              How to Care for Your Wound After Its Treated With  DERMABOND* Topical Skin Adhesive  DERMABOND* Topical Skin Adhesive (2-octyl cyanoacrylate) is a sterile, liquid skin adhesive  that holds wound edges together. The film will usually remain in place for 5 to 10 days, then  naturally fall off your skin. The following will answer some of your questions and provide instructions for proper care for your  wound while it is healing:    CHECK WOUND APPEARANCE   Some swelling, redness, and pain are common with all wounds and normally will go away as the  wound heals. If swelling, redness, or pain increases or if the wound feels warm to the touch,  contact a doctor. Also contact a doctor if the wound edges reopen or separate. REPLACE BANDAGES   If your wound is bandaged, keep the bandage dry.  Replace the dressing daily until the adhesive film has fallen off or if the  bandage should become wet, unless otherwise instructed by your  physician.    When changing the dressing, do not place tape directly over the  DERMABOND adhesive film, because removing the tape later may also  remove the film.  AVOID TOPICAL MEDICATIONS   Do not apply liquid or ointment medications or any other product to your wound while the  DERMABOND adhesive film is in place. These may loosen the film before your wound is healed. KEEP WOUND DRY AND PROTECTED   You may occasionally and briefly wet your wound in the shower or bath. Do not soak or scrub  your wound, do not swim, and avoid periods of heavy perspiration until the DERMABOND  adhesive has naturally fallen off. After showering or bathing, gently blot your wound dry with a  soft towel. If a protective dressing is being used, apply a fresh, dry bandage, being sure to keep  the tape off the DERMABOND adhesive film.  Apply a clean, dry bandage over the wound if necessary to protect it.  Protect your wound from injury until the skin has had sufficient time to heal.   Do not scratch, rub, or pick at the DERMABOND adhesive film. This may loosen the film before  your wound is healed.  Protect the wound from prolonged exposure to sunlight or tanning lamps while the film is in  place. If you have any questions or concerns about this product, please consult your doctor. *Trademark ©[employer] 2002    Patient Education      Tramadol (Ultram, Ultram ER, [place], Theratramadol-60) - (By mouth)   Why this medicine is used:   Treats moderate to severe pain. This medicine is a narcotic pain reliever. Contact a nurse or doctor right away if you have:  · Extreme weakness, shallow breathing  · Seizures  · Seeing or hearing things that are not there  · Anxiety, restlessness, muscle spasms, fever, sweating, diarrhea  · Slow or fast heartbeat     Common side effects:  · Nausea, vomiting, constipation  · Headache, drowsiness  · Itching, feeling of warmth, redness of the face, neck, arms, and upper chest  © 2017 [facility] Information is for End User's use only and may not be sold, redistributed or otherwise used for commercial purposes.   Patient Education Naloxone (Narcan) - (Into the nose)   Why this medicine is used:   Treats opioid overdose in an emergency situation. Contact a nurse or doctor right away if you have:  · Fast, pounding, or uneven heartbeat, trouble breathing  · Seizures, tremors, feeling restless, nervous, or irritable  · Diarrhea, nausea, vomiting, stomach cramps  · Crying more than the usual (in babies)  · Fever, runny nose, sneezing, sweating, yawning   © 2017 [facility] Information is for End User's use only and may not be sold, redistributed or otherwise used for commercial purposes. A common side effect of anesthesia following surgery is nausea and/or vomiting. In order to decrease symptoms, it is wise to avoid foods that are high in fat, greasy foods, milk products, and spicy foods for the first 24 hours. Acceptable foods for the first 24 hours following surgery include but are not limited to:     soup   broth    toast    crackers    applesauce    bananas    mashed potatoes,   soft or scrambled eggs   oatmeal    jello    It is important to eat when taking your pain medication. This will help to prevent nausea. If possible, please try to time your meals with your medications. It is very important to stay hydrated following surgery. Sip fluids frequently while awake. Avoid acidic drinks such as citrus juices and soda for 24 hours. Carbonated beverages may cause bloating and gas. Acceptable fluids include:    - water (flavor packets may add variety)  - coffee or tea (in moderation)  - Gatorade  - José Miguel-aid  - apple juice  - cranberry juice    You are encouraged to cough and deep breathe every hour when awake. This will help to prevent respiratory complications following anesthesia. You may want to hug a pillow when coughing and sneezing to add additional support to the surgical area and to decrease discomfort if you had abdominal or chest surgery.     If you are discharged home with support stockings, you may remove them after 24 hours. Support stockings are used to help prevent blood clots in the legs following surgery. Please take time to review all of your Home Care Instructions and Medication Information sheets provided in your discharge packet. If you have any questions, please contact your surgeons office. Thank you. DISCHARGE SUMMARY from Nurse    PATIENT INSTRUCTIONS:    After general anesthesia or intravenous sedation, for 24 hours or while taking prescription Narcotics:  · Limit your activities  · Do not drive and operate hazardous machinery  · Do not make important personal or business decisions  · Do  not drink alcoholic beverages  · If you have not urinated within 8 hours after discharge, please contact your surgeon on call. Report the following to your surgeon:  · Excessive pain, swelling, redness or odor of or around the surgical area  · Temperature over 100.5  · Nausea and vomiting lasting longer than 4 hours or if unable to take medications  · Any signs of decreased circulation or nerve impairment to extremity: change in color, persistent  numbness, tingling, coldness or increase pain  · Any questions    These are general instructions for a healthy lifestyle:    No smoking/ No tobacco products/ Avoid exposure to second hand smoke  Surgeon General's Warning:  Quitting smoking now greatly reduces serious risk to your health. Obesity, smoking, and sedentary lifestyle greatly increases your risk for illness    A healthy diet, regular physical exercise & weight monitoring are important for maintaining a healthy lifestyle    You may be retaining fluid if you have a history of heart failure or if you experience any of the following symptoms:  Weight gain of 3 pounds or more overnight or 5 pounds in a week, increased swelling in our hands or feet or shortness of breath while lying flat in bed.   Please call your doctor as soon as you notice any of these symptoms; do not wait until your next office visit. Recognize signs and symptoms of STROKE:    F-face looks uneven    A-arms unable to move or move unevenly    S-speech slurred or non-existent    T-time-call 911 as soon as signs and symptoms begin-DO NOT go       Back to bed or wait to see if you get better-TIME IS BRAIN. Warning Signs of HEART ATTACK     Call 911 if you have these symptoms:   Chest discomfort. Most heart attacks involve discomfort in the center of the chest that lasts more than a few minutes, or that goes away and comes back. It can feel like uncomfortable pressure, squeezing, fullness, or pain.  Discomfort in other areas of the upper body. Symptoms can include pain or discomfort in one or both arms, the back, neck, jaw, or stomach.  Shortness of breath with or without chest discomfort.  Other signs may include breaking out in a cold sweat, nausea, or lightheadedness. Don't wait more than five minutes to call 911 - MINUTES MATTER! Fast action can save your life. Calling 911 is almost always the fastest way to get lifesaving treatment. Emergency Medical Services staff can begin treatment when they arrive -- up to an hour sooner than if someone gets to the hospital by car. The discharge information has been reviewed with the patient and caregiver. The patient and caregiver verbalized understanding. Discharge medications reviewed with the patient and caregiver and appropriate educational materials and side effects teaching were provided.   ___________________________________________________________________________________________________________________________________

## 2019-05-31 NOTE — OP NOTES
Operative Report    CPT 53610, 34230        Replacement of Vagus Nerve Stimulator Generator [facility])); Electronic Testing and Programming        Patient:  Mauricio Lind    Date of Surgery:  5/31/2019    Preoperative diagnosis: Dysfunction of Vagus Nerve Stimulator Generator (low battery), Refractory Seizure Disorder    Postoperative Diagnosis: Same    Anesthesia:  General    Surgeon:  Deanna Pan MD    Surg Asst-1: Sumi Maier    Operative Summary:     The patient was taken to the operating room and placed on the operating table in the supine position with the neck extended and rotated to the right. The patient's left neck and chest were prepared and sterilely draped. An [place] drape was utilized. An incision was made along the left anterior axillary line about 5 cm in length through the previous surgical scar. .  The incision was carried down to the capsule surrounding the VNS generator. The capsule was opened and the generator brought out after division of the prolene fixation suture. The set screw was loosened and the old generator removed. A new vagus nerve stimulator generator [facility]) was then attached to the lead with the set screw and the device was interrogated electronically with the generator outside of the patient. A lead test was performed; readings were normal.  The generator was then placed in the pocket. Testing was repeated and heart rate sensing was tested and confirmed. The generator was reset to the patient's previous settings and the device turned on. Autostimulation was turned on. The generator was then attached to the pectoralis fascia with a 4-0 prolene. The capsule and the subcutaneous tissue at the pectoral site were closed with 3-0 chromic and the skin with 4-0 Monocryl intracuticular suture. Exofin was applied to the incision. The patient tolerated the procedure well and was taken to the Recovery Room in stable condition.       Specimen - none    Drain - none    Estimated blood loss - 3 ml    Complications - none    Implants:   Implant Name Type Inv. Item Serial No.  Lot No. LRB No. Used Action   GENERATOR VAGUS NRV STIM -- SENTIVA **APPROVAL REQUIRED** - [id number]   GENERATOR VAGUS NRV STIM -- SENTIVA **APPROVAL REQUIRED** [id number] LIVANOVA - FKA SAUD NA Left 1 Implanted   GENERATOR VAGUS NRV STIM 14ML -- SGL PIN ASPIRESR - SNA   GENERATOR VAGUS NRV STIM 14ML -- SGL PIN ASPIRE SR [employer] [id number] Left 1 Explanted             MELINDA Ruiz MD

## 2019-05-31 NOTE — BRIEF OP NOTE
BRIEF OPERATIVE NOTE    Date of Procedure: 5/31/2019   Preoperative Diagnosis: INTRACTABLE PARTIAL COMPLEX SEIZURE DISORDER  Postoperative Diagnosis: INTRACTABLE PARTIAL COMPLEX SEIZURE DISORDER    Procedure(s):  REPLACEMENT OF BENJY NERVE STIMULATOR, GENERATOR; ELECTRONIC TESTING AND PROGRAMMING  Surgeon(s) and Role:     Wally Kruger MD - Primary         Surgical Assistant: staff    Surgical Staff:  Circ-1: Mary Lou Jones RN  Scrub Tech-1: Oneda Benes  Surg Asst-1: Madeline Retort  Event Time In Time Out   Incision Start 8539    Incision Close 1032      Anesthesia: General   Estimated Blood Loss: 3 ml  Specimens: * No specimens in log *   Findings: Testing confirmed new generator and existing lead functioning properly. New generator programmed to patient's prior settings. Autostimulation mode turned on. Device is turned on. Complications: none  Implants:   Implant Name Type Inv.  Item Serial No.  Lot No. LRB No. Used Action   GENERATOR VAGUS NRV STIM -- SENTIVA **APPROVAL REQUIRED** - [id number]  GENERATOR VAGUS NRV STIM -- SENTIVA **APPROVAL REQUIRED** [id number] [id number]  [employer] NA Left 1 Implanted   GENERATOR VAGUS NRV STIM 14ML -- SGL PIN ASPIRESR - SNA  GENERATOR VAGUS NRV STIM 14ML -- SGL PIN ASPIRE SR NA [id number] Left 1 Explanted

## 2019-05-31 NOTE — ANESTHESIA PREPROCEDURE EVALUATION
Anesthetic History   No history of anesthetic complications            Review of Systems / Medical History  Patient summary reviewed, nursing notes reviewed and pertinent labs reviewed    Pulmonary  Within defined limits              Comments: H/O Respiratory failure   Neuro/Psych     seizures: poorly controlled    Neuromuscular disease    Comments:  Anorexia (R63.0)         Encephalopathy (G93.40)        Intellectual disability (F79)        Autistic disorder (F84.0)    Status epilepticus (HCC) (G40.301)  Chronic mental illness (F99)        Status post VNS (vagus nerve stimulator) placement (Z98.89)  Cardiovascular    Hypertension              Exercise tolerance: <4 METS     GI/Hepatic/Renal  Within defined limits              Endo/Other        Arthritis     Other Findings   Comments: Dehydration (E86.0)        Vasospasm (HCC) (I73.9)        Ovarian cyst (N83.20)     Hypertension (I10)        Dental caries (K02.9)      Arthritis (M19.90) Muscle weakness (M62.81)                   Physical Exam    Airway      Neck ROM: decreased range of motion, short neck       Comments: Unable to access  Cardiovascular    Rhythm: regular  Rate: normal         Dental      Comments: Unable to access   Pulmonary      Decreased breath sounds: bilateral           Abdominal  GI exam deferred       Other Findings            Anesthetic Plan    ASA: 3  Anesthesia type: general          Induction: Intravenous  Anesthetic plan and risks discussed with: Legal guardian

## 2019-06-17 ENCOUNTER — OFFICE VISIT (OUTPATIENT)
Dept: SURGERY | Age: 29
End: 2019-06-17

## 2019-06-17 VITALS
HEIGHT: 64 IN | WEIGHT: 151 LBS | DIASTOLIC BLOOD PRESSURE: 101 MMHG | SYSTOLIC BLOOD PRESSURE: 141 MMHG | RESPIRATION RATE: 20 BRPM | OXYGEN SATURATION: 100 % | HEART RATE: 72 BPM | BODY MASS INDEX: 25.78 KG/M2

## 2019-06-17 DIAGNOSIS — Z09 POSTOPERATIVE EXAMINATION: Primary | ICD-10-CM

## 2019-06-17 NOTE — PROGRESS NOTES
The patient is status post replacement of vagus nerve stimulator generator. The new generator is a Sentiva model. The patient has no complaints. On examination the incision site left anterior axillary line is healing nicely. The generator was set to the patient's existing settings at the time of surgery. Autostimulation mode was  turned on. The patient will continue follow up with her neurologist.     The patient can see me prn. Final Diagnosis: Status post replacement vagus nerve stimulator generator, post-operative visit.        cc: Missy Arizmendi MD

## 2019-06-17 NOTE — Clinical Note
6/17/19 Patient: Yvonne Fierro YOB: 1990 Date of Visit: 6/17/2019 Constantine Bryson NP 
[address] 
[facility] [address] VIA Facsimile: [phone] Dear Constantine Bryson NP, Thank you for referring Ms. Ricardo Reeder to  [facility]  for evaluation. My notes for this consultation are attached. If you have questions, please do not hesitate to call me. I look forward to following your patient along with you. Sincerely, Enio Hess MD

## 2019-06-17 NOTE — PROGRESS NOTES
Chief Complaint   Patient presents with    Post OP Follow Up     Dysfunction of Vagus Nerve Stimulator Generator (low battery), Refractory Seizure Disorder 5/31/19       1. Have you been to the ER, urgent care clinic since your last visit? Hospitalized since your last visit? NO    2. Have you seen or consulted any other health care providers outside of the [address] [place] [address] since your last visit? Include any pap smears or colon screening.  NO

## 2019-06-17 NOTE — PROGRESS NOTES
Chief Complaint   Patient presents with    Post OP Follow Up     Replacement of Vagus Nerve Stimulator Generator [facility])); Electronic 05/31/2019     1. Have you been to the ER, urgent care clinic since your last visit? Hospitalized since your last visit? No    2. Have you seen or consulted any other health care providers outside of the [address] [place] [address] since your last visit? Include any pap smears or colon screening.  No

## 2022-03-18 PROBLEM — G40.219 LOCALIZATION-RELATED SYMPTOMATIC EPILEPSY AND EPILEPTIC SYNDROMES WITH COMPLEX PARTIAL SEIZURES, INTRACTABLE, WITHOUT STATUS EPILEPTICUS (HCC): Status: ACTIVE | Noted: 2017-03-09

## 2024-03-14 ENCOUNTER — HOSPITAL ENCOUNTER (INPATIENT)
Facility: HOSPITAL | Age: 34
LOS: 1 days | Discharge: HOME | End: 2024-03-15
Attending: GENERAL ACUTE CARE HOSPITAL | Admitting: STUDENT IN AN ORGANIZED HEALTH CARE EDUCATION/TRAINING PROGRAM
Payer: MEDICARE

## 2024-03-14 DIAGNOSIS — G40.209 COMPLEX PARTIAL SEIZURES EVOLVING TO GENERALIZED TONIC-CLONIC SEIZURES (HCC): Primary | ICD-10-CM

## 2024-03-14 PROBLEM — G40.919 BREAKTHROUGH SEIZURE (HCC): Status: ACTIVE | Noted: 2024-03-14

## 2024-03-14 PROCEDURE — 6370000000 HC RX 637 (ALT 250 FOR IP): Performed by: STUDENT IN AN ORGANIZED HEALTH CARE EDUCATION/TRAINING PROGRAM

## 2024-03-14 PROCEDURE — 1100000003 HC PRIVATE W/ TELEMETRY

## 2024-03-14 PROCEDURE — C9254 INJECTION, LACOSAMIDE: HCPCS | Performed by: STUDENT IN AN ORGANIZED HEALTH CARE EDUCATION/TRAINING PROGRAM

## 2024-03-14 PROCEDURE — 6360000002 HC RX W HCPCS: Performed by: STUDENT IN AN ORGANIZED HEALTH CARE EDUCATION/TRAINING PROGRAM

## 2024-03-14 PROCEDURE — 2580000003 HC RX 258: Performed by: STUDENT IN AN ORGANIZED HEALTH CARE EDUCATION/TRAINING PROGRAM

## 2024-03-14 RX ORDER — SODIUM CHLORIDE 0.9 % (FLUSH) 0.9 %
5-40 SYRINGE (ML) INJECTION EVERY 12 HOURS SCHEDULED
Status: DISCONTINUED | OUTPATIENT
Start: 2024-03-14 | End: 2024-03-16 | Stop reason: HOSPADM

## 2024-03-14 RX ORDER — FAMOTIDINE 20 MG/1
20 TABLET, FILM COATED ORAL 2 TIMES DAILY
Status: DISCONTINUED | OUTPATIENT
Start: 2024-03-14 | End: 2024-03-16 | Stop reason: HOSPADM

## 2024-03-14 RX ORDER — POLYETHYLENE GLYCOL 3350 17 G/17G
17 POWDER, FOR SOLUTION ORAL DAILY PRN
Status: DISCONTINUED | OUTPATIENT
Start: 2024-03-14 | End: 2024-03-16 | Stop reason: HOSPADM

## 2024-03-14 RX ORDER — ACETAMINOPHEN 650 MG/1
650 SUPPOSITORY RECTAL EVERY 6 HOURS PRN
Status: DISCONTINUED | OUTPATIENT
Start: 2024-03-14 | End: 2024-03-16 | Stop reason: HOSPADM

## 2024-03-14 RX ORDER — SODIUM CHLORIDE 9 MG/ML
INJECTION, SOLUTION INTRAVENOUS PRN
Status: DISCONTINUED | OUTPATIENT
Start: 2024-03-14 | End: 2024-03-16 | Stop reason: HOSPADM

## 2024-03-14 RX ORDER — ONDANSETRON 4 MG/1
4 TABLET, ORALLY DISINTEGRATING ORAL EVERY 8 HOURS PRN
Status: DISCONTINUED | OUTPATIENT
Start: 2024-03-14 | End: 2024-03-16 | Stop reason: HOSPADM

## 2024-03-14 RX ORDER — DEXAMETHASONE SODIUM PHOSPHATE 1 MG/ML
2 SOLUTION/ DROPS OPHTHALMIC 2 TIMES DAILY
Status: DISCONTINUED | OUTPATIENT
Start: 2024-03-14 | End: 2024-03-16 | Stop reason: HOSPADM

## 2024-03-14 RX ORDER — ENOXAPARIN SODIUM 100 MG/ML
40 INJECTION SUBCUTANEOUS DAILY
Status: DISCONTINUED | OUTPATIENT
Start: 2024-03-15 | End: 2024-03-16 | Stop reason: HOSPADM

## 2024-03-14 RX ORDER — DIAZEPAM 5 MG/ML
5 INJECTION, SOLUTION INTRAMUSCULAR; INTRAVENOUS
Status: ACTIVE | OUTPATIENT
Start: 2024-03-14 | End: 2024-03-15

## 2024-03-14 RX ORDER — LACOSAMIDE 10 MG/ML
200 INJECTION, SOLUTION INTRAVENOUS 2 TIMES DAILY
Status: DISCONTINUED | OUTPATIENT
Start: 2024-03-14 | End: 2024-03-15

## 2024-03-14 RX ORDER — OFLOXACIN 3 MG/ML
2 SOLUTION/ DROPS OPHTHALMIC 2 TIMES DAILY
Status: DISCONTINUED | OUTPATIENT
Start: 2024-03-14 | End: 2024-03-16 | Stop reason: HOSPADM

## 2024-03-14 RX ORDER — ONDANSETRON 2 MG/ML
4 INJECTION INTRAMUSCULAR; INTRAVENOUS EVERY 6 HOURS PRN
Status: DISCONTINUED | OUTPATIENT
Start: 2024-03-14 | End: 2024-03-16 | Stop reason: HOSPADM

## 2024-03-14 RX ORDER — LACOSAMIDE 10 MG/ML
200 INJECTION, SOLUTION INTRAVENOUS 2 TIMES DAILY
Status: ON HOLD | COMMUNITY
End: 2024-03-15 | Stop reason: ALTCHOICE

## 2024-03-14 RX ORDER — SODIUM CHLORIDE 0.9 % (FLUSH) 0.9 %
5-40 SYRINGE (ML) INJECTION PRN
Status: DISCONTINUED | OUTPATIENT
Start: 2024-03-14 | End: 2024-03-16 | Stop reason: HOSPADM

## 2024-03-14 RX ORDER — FAMOTIDINE 20 MG/1
20 TABLET, FILM COATED ORAL 2 TIMES DAILY PRN
COMMUNITY

## 2024-03-14 RX ORDER — VALSARTAN 80 MG/1
80 TABLET ORAL DAILY
COMMUNITY

## 2024-03-14 RX ORDER — CIPROFLOXACIN AND FLUOCINOLONE ACETONIDE .75; .0625 MG/.25ML; MG/.25ML
0.25 SOLUTION AURICULAR (OTIC) 2 TIMES DAILY
Status: DISCONTINUED | OUTPATIENT
Start: 2024-03-14 | End: 2024-03-14 | Stop reason: ALTCHOICE

## 2024-03-14 RX ORDER — ACETAMINOPHEN 325 MG/1
650 TABLET ORAL EVERY 6 HOURS PRN
Status: DISCONTINUED | OUTPATIENT
Start: 2024-03-14 | End: 2024-03-16 | Stop reason: HOSPADM

## 2024-03-14 RX ADMIN — LACOSAMIDE 200 MG: 10 INJECTION INTRAVENOUS at 21:55

## 2024-03-14 RX ADMIN — DEXAMETHASONE SODIUM PHOSPHATE 2 DROP: 1 SOLUTION/ DROPS OPHTHALMIC at 23:03

## 2024-03-14 RX ADMIN — SODIUM CHLORIDE, PRESERVATIVE FREE 10 ML: 5 INJECTION INTRAVENOUS at 23:04

## 2024-03-14 RX ADMIN — FAMOTIDINE 20 MG: 20 TABLET, FILM COATED ORAL at 22:02

## 2024-03-14 RX ADMIN — OFLOXACIN 2 DROP: 3 SOLUTION OPHTHALMIC at 23:03

## 2024-03-14 ASSESSMENT — PAIN SCALES - WONG BAKER
WONGBAKER_NUMERICALRESPONSE: NO HURT
WONGBAKER_NUMERICALRESPONSE: 0

## 2024-03-15 VITALS
DIASTOLIC BLOOD PRESSURE: 88 MMHG | SYSTOLIC BLOOD PRESSURE: 124 MMHG | HEIGHT: 65 IN | RESPIRATION RATE: 18 BRPM | HEART RATE: 80 BPM | OXYGEN SATURATION: 99 % | BODY MASS INDEX: 23.32 KG/M2 | TEMPERATURE: 98.3 F | WEIGHT: 140 LBS

## 2024-03-15 PROBLEM — R41.82 ACUTE ALTERATION IN MENTAL STATUS: Status: ACTIVE | Noted: 2024-03-15

## 2024-03-15 PROBLEM — G40.209 COMPLEX PARTIAL SEIZURES EVOLVING TO GENERALIZED TONIC-CLONIC SEIZURES (HCC): Status: ACTIVE | Noted: 2024-03-15

## 2024-03-15 LAB
ANION GAP SERPL CALC-SCNC: 5 MMOL/L (ref 5–15)
BASOPHILS # BLD: 0 K/UL (ref 0–0.1)
BASOPHILS NFR BLD: 0 % (ref 0–1)
BUN SERPL-MCNC: 11 MG/DL (ref 6–20)
BUN/CREAT SERPL: 21 (ref 12–20)
CALCIUM SERPL-MCNC: 9 MG/DL (ref 8.5–10.1)
CHLORIDE SERPL-SCNC: 114 MMOL/L (ref 97–108)
CO2 SERPL-SCNC: 23 MMOL/L (ref 21–32)
CREAT SERPL-MCNC: 0.53 MG/DL (ref 0.55–1.02)
DIFFERENTIAL METHOD BLD: ABNORMAL
EOSINOPHIL # BLD: 0 K/UL (ref 0–0.4)
EOSINOPHIL NFR BLD: 0 % (ref 0–7)
ERYTHROCYTE [DISTWIDTH] IN BLOOD BY AUTOMATED COUNT: 12.8 % (ref 11.5–14.5)
GLUCOSE SERPL-MCNC: 88 MG/DL (ref 65–100)
HCT VFR BLD AUTO: 38.6 % (ref 35–47)
HGB BLD-MCNC: 12.4 G/DL (ref 11.5–16)
IMM GRANULOCYTES # BLD AUTO: 0 K/UL (ref 0–0.04)
IMM GRANULOCYTES NFR BLD AUTO: 1 % (ref 0–0.5)
LYMPHOCYTES # BLD: 3 K/UL (ref 0.8–3.5)
LYMPHOCYTES NFR BLD: 36 % (ref 12–49)
MCH RBC QN AUTO: 31.8 PG (ref 26–34)
MCHC RBC AUTO-ENTMCNC: 32.1 G/DL (ref 30–36.5)
MCV RBC AUTO: 99 FL (ref 80–99)
MONOCYTES # BLD: 1.2 K/UL (ref 0–1)
MONOCYTES NFR BLD: 14 % (ref 5–13)
NEUTS SEG # BLD: 4.1 K/UL (ref 1.8–8)
NEUTS SEG NFR BLD: 49 % (ref 32–75)
NRBC # BLD: 0 K/UL (ref 0–0.01)
NRBC BLD-RTO: 0 PER 100 WBC
PLATELET # BLD AUTO: 174 K/UL (ref 150–400)
PMV BLD AUTO: 10.7 FL (ref 8.9–12.9)
POTASSIUM SERPL-SCNC: 4.2 MMOL/L (ref 3.5–5.1)
RBC # BLD AUTO: 3.9 M/UL (ref 3.8–5.2)
SODIUM SERPL-SCNC: 142 MMOL/L (ref 136–145)
WBC # BLD AUTO: 8.4 K/UL (ref 3.6–11)

## 2024-03-15 PROCEDURE — 95819 EEG AWAKE AND ASLEEP: CPT | Performed by: PSYCHIATRY & NEUROLOGY

## 2024-03-15 PROCEDURE — 6360000002 HC RX W HCPCS: Performed by: STUDENT IN AN ORGANIZED HEALTH CARE EDUCATION/TRAINING PROGRAM

## 2024-03-15 PROCEDURE — 36415 COLL VENOUS BLD VENIPUNCTURE: CPT

## 2024-03-15 PROCEDURE — 97161 PT EVAL LOW COMPLEX 20 MIN: CPT

## 2024-03-15 PROCEDURE — 6370000000 HC RX 637 (ALT 250 FOR IP): Performed by: INTERNAL MEDICINE

## 2024-03-15 PROCEDURE — 92610 EVALUATE SWALLOWING FUNCTION: CPT

## 2024-03-15 PROCEDURE — 95706 EEG WO VID 2-12HR INTMT MNTR: CPT

## 2024-03-15 PROCEDURE — 6370000000 HC RX 637 (ALT 250 FOR IP): Performed by: PSYCHIATRY & NEUROLOGY

## 2024-03-15 PROCEDURE — 80048 BASIC METABOLIC PNL TOTAL CA: CPT

## 2024-03-15 PROCEDURE — 95816 EEG AWAKE AND DROWSY: CPT

## 2024-03-15 PROCEDURE — C9254 INJECTION, LACOSAMIDE: HCPCS | Performed by: STUDENT IN AN ORGANIZED HEALTH CARE EDUCATION/TRAINING PROGRAM

## 2024-03-15 PROCEDURE — 2580000003 HC RX 258: Performed by: STUDENT IN AN ORGANIZED HEALTH CARE EDUCATION/TRAINING PROGRAM

## 2024-03-15 PROCEDURE — 85025 COMPLETE CBC W/AUTO DIFF WBC: CPT

## 2024-03-15 PROCEDURE — 97530 THERAPEUTIC ACTIVITIES: CPT

## 2024-03-15 PROCEDURE — 99222 1ST HOSP IP/OBS MODERATE 55: CPT | Performed by: PSYCHIATRY & NEUROLOGY

## 2024-03-15 PROCEDURE — 97165 OT EVAL LOW COMPLEX 30 MIN: CPT

## 2024-03-15 PROCEDURE — 6370000000 HC RX 637 (ALT 250 FOR IP): Performed by: STUDENT IN AN ORGANIZED HEALTH CARE EDUCATION/TRAINING PROGRAM

## 2024-03-15 RX ORDER — DEXAMETHASONE SODIUM PHOSPHATE 1 MG/ML
2 SOLUTION/ DROPS OPHTHALMIC 2 TIMES DAILY
Qty: 1.2 ML | Refills: 0 | Status: SHIPPED | OUTPATIENT
Start: 2024-03-15 | End: 2024-03-15 | Stop reason: HOSPADM

## 2024-03-15 RX ORDER — CLONAZEPAM 1 MG/1
1 TABLET ORAL EVERY 12 HOURS
Qty: 60 TABLET | Refills: 0 | Status: SHIPPED | OUTPATIENT
Start: 2024-03-16 | End: 2024-03-15 | Stop reason: HOSPADM

## 2024-03-15 RX ORDER — LACOSAMIDE 200 MG/1
200 TABLET ORAL 2 TIMES DAILY
COMMUNITY

## 2024-03-15 RX ORDER — DEXAMETHASONE SODIUM PHOSPHATE 1 MG/ML
2 SOLUTION/ DROPS OPHTHALMIC 2 TIMES DAILY
Qty: 1.2 ML | Refills: 0 | Status: SHIPPED | OUTPATIENT
Start: 2024-03-15 | End: 2024-03-15

## 2024-03-15 RX ORDER — AMOXICILLIN AND CLAVULANATE POTASSIUM 875; 125 MG/1; MG/1
1 TABLET, FILM COATED ORAL 2 TIMES DAILY
Status: ON HOLD | COMMUNITY
Start: 2024-03-13 | End: 2024-03-15 | Stop reason: HOSPADM

## 2024-03-15 RX ORDER — MULTIVIT-MIN/IRON/FOLIC ACID/K 18-600-40
1 CAPSULE ORAL DAILY
COMMUNITY

## 2024-03-15 RX ORDER — LACOSAMIDE 100 MG/1
200 TABLET ORAL 2 TIMES DAILY
Status: DISCONTINUED | OUTPATIENT
Start: 2024-03-15 | End: 2024-03-16 | Stop reason: HOSPADM

## 2024-03-15 RX ORDER — CASTOR OIL AND BALSAM, PERU 788; 87 MG/G; MG/G
OINTMENT TOPICAL 2 TIMES DAILY
Status: DISCONTINUED | OUTPATIENT
Start: 2024-03-15 | End: 2024-03-16 | Stop reason: HOSPADM

## 2024-03-15 RX ORDER — VALPROIC ACID 250 MG/5ML
250 SOLUTION ORAL 3 TIMES DAILY
Status: DISCONTINUED | OUTPATIENT
Start: 2024-03-15 | End: 2024-03-16 | Stop reason: HOSPADM

## 2024-03-15 RX ORDER — CLOBAZAM 2.5 MG/ML
4 SUSPENSION ORAL 2 TIMES DAILY
COMMUNITY

## 2024-03-15 RX ORDER — CLOBAZAM 2.5 MG/ML
4 SUSPENSION ORAL 2 TIMES DAILY
Status: CANCELLED | OUTPATIENT
Start: 2024-03-15

## 2024-03-15 RX ORDER — VALPROIC ACID 250 MG/5ML
250 SOLUTION ORAL 3 TIMES DAILY
Qty: 600 ML | Refills: 0 | Status: SHIPPED | OUTPATIENT
Start: 2024-03-15 | End: 2024-04-14

## 2024-03-15 RX ORDER — CLOBAZAM 2.5 MG/ML
4 SUSPENSION ORAL 2 TIMES DAILY
Status: DISCONTINUED | OUTPATIENT
Start: 2024-03-15 | End: 2024-03-16 | Stop reason: HOSPADM

## 2024-03-15 RX ORDER — OFLOXACIN 3 MG/ML
2 SOLUTION/ DROPS OPHTHALMIC 2 TIMES DAILY
Qty: 1 EACH | Refills: 0 | Status: SHIPPED | OUTPATIENT
Start: 2024-03-15 | End: 2024-03-15

## 2024-03-15 RX ORDER — DOCUSATE SODIUM 100 MG/1
100 CAPSULE, LIQUID FILLED ORAL DAILY
COMMUNITY

## 2024-03-15 RX ORDER — POLYETHYLENE GLYCOL 3350 17 G/17G
17 POWDER, FOR SOLUTION ORAL
COMMUNITY

## 2024-03-15 RX ORDER — OFLOXACIN 3 MG/ML
2 SOLUTION/ DROPS OPHTHALMIC 2 TIMES DAILY
Qty: 1 EACH | Refills: 0 | Status: SHIPPED | OUTPATIENT
Start: 2024-03-15 | End: 2024-03-15 | Stop reason: HOSPADM

## 2024-03-15 RX ORDER — VALPROIC ACID 250 MG/5ML
375 SOLUTION ORAL NIGHTLY
COMMUNITY

## 2024-03-15 RX ORDER — CLONAZEPAM 1 MG/1
1 TABLET ORAL EVERY 12 HOURS
Status: DISCONTINUED | OUTPATIENT
Start: 2024-03-15 | End: 2024-03-16 | Stop reason: HOSPADM

## 2024-03-15 RX ORDER — CLONAZEPAM 1 MG/1
1 TABLET ORAL EVERY 12 HOURS
Qty: 60 TABLET | Refills: 0 | Status: SHIPPED | OUTPATIENT
Start: 2024-03-16 | End: 2024-03-15

## 2024-03-15 RX ORDER — LACOSAMIDE 50 MG/1
200 TABLET ORAL 2 TIMES DAILY
Status: ON HOLD | COMMUNITY
End: 2024-03-15 | Stop reason: ALTCHOICE

## 2024-03-15 RX ORDER — OFLOXACIN 3 MG/ML
5 SOLUTION AURICULAR (OTIC) 2 TIMES DAILY
Qty: 5 ML | Refills: 0 | Status: SHIPPED | OUTPATIENT
Start: 2024-03-15 | End: 2024-03-25

## 2024-03-15 RX ADMIN — FAMOTIDINE 20 MG: 20 TABLET, FILM COATED ORAL at 12:11

## 2024-03-15 RX ADMIN — Medication: at 09:34

## 2024-03-15 RX ADMIN — SODIUM CHLORIDE, PRESERVATIVE FREE 10 ML: 5 INJECTION INTRAVENOUS at 09:36

## 2024-03-15 RX ADMIN — OFLOXACIN 2 DROP: 3 SOLUTION OPHTHALMIC at 20:13

## 2024-03-15 RX ADMIN — VALPROIC ACID 250 MG: 250 SOLUTION ORAL at 19:52

## 2024-03-15 RX ADMIN — LACOSAMIDE 200 MG: 100 TABLET, FILM COATED ORAL at 19:52

## 2024-03-15 RX ADMIN — LACOSAMIDE 200 MG: 10 INJECTION INTRAVENOUS at 11:00

## 2024-03-15 RX ADMIN — SODIUM CHLORIDE, PRESERVATIVE FREE 10 ML: 5 INJECTION INTRAVENOUS at 20:13

## 2024-03-15 RX ADMIN — FAMOTIDINE 20 MG: 20 TABLET, FILM COATED ORAL at 19:52

## 2024-03-15 RX ADMIN — Medication: at 20:00

## 2024-03-15 RX ADMIN — CLOBAZAM 4 MG: 2.5 SUSPENSION ORAL at 18:01

## 2024-03-15 RX ADMIN — DEXAMETHASONE SODIUM PHOSPHATE 2 DROP: 1 SOLUTION/ DROPS OPHTHALMIC at 20:12

## 2024-03-15 RX ADMIN — ENOXAPARIN SODIUM 40 MG: 100 INJECTION SUBCUTANEOUS at 09:34

## 2024-03-15 RX ADMIN — CLONAZEPAM 1 MG: 1 TABLET ORAL at 14:56

## 2024-03-15 RX ADMIN — OFLOXACIN 2 DROP: 3 SOLUTION OPHTHALMIC at 09:36

## 2024-03-15 RX ADMIN — DEXAMETHASONE SODIUM PHOSPHATE 2 DROP: 1 SOLUTION/ DROPS OPHTHALMIC at 09:36

## 2024-03-15 RX ADMIN — VALPROIC ACID 250 MG: 250 SOLUTION ORAL at 14:56

## 2024-03-15 ASSESSMENT — PAIN SCALES - WONG BAKER
WONGBAKER_NUMERICALRESPONSE: 0
WONGBAKER_NUMERICALRESPONSE: NO HURT
WONGBAKER_NUMERICALRESPONSE: 0
WONGBAKER_NUMERICALRESPONSE: 0

## 2024-03-15 ASSESSMENT — PAIN SCALES - GENERAL
PAINLEVEL_OUTOF10: 0
PAINLEVEL_OUTOF10: 0

## 2024-03-15 NOTE — PROGRESS NOTES
PHYSICAL THERAPY EVALUATION/DISCHARGE    Patient: Monica Guajardo (33 y.o. female)  Date: 3/15/2024  Primary Diagnosis: Breakthrough seizure (HCC) [G40.919]       Precautions: Bed Alarm, Fall Risk                      ASSESSMENT AND RECOMMENDATIONS:  Based on the objective data below, the patient was received for PT session supine in bed, nonverbal at baseline. She required mod A for bed mobility and hand over hand assist for rail. Sitting balance initially min A needed for upright but improved to SBA with B UE support on EOB. Dynamic sitting balance task EOB with donning socks needing SBA-CGA for sitting balance, pt able to kick each LE out for assisted donning. Sit <> Stand EOB with min A x2 via underarm/hand held assist which is her baseline. Pt stepped 3 feet bed >chair with min A x2 via underarm assist, no buckling or major LOB noted. Boosted up in chair with max A. Pt left up in chair, all needs met, call bell in reach B LE elevated and chair alarm on. Discussed with RN use of pancake call bell 2/2 limited hand function. She is at her mobility baseline, recommend return to group home with assist and possible HH if needed. Will sign off.    Functional Outcome Measure:  The patient scored 13/24 on the [facility] outcome measure which is indicative of likely need for continued therapy at [place].          Further skilled acute physical therapy is not indicated at this time.       PLAN :  Recommendation for discharge: (in order for the patient to meet his/her long term goals): Therapy 2 days/week in the home    Other factors to consider for discharge: impaired cognition, high risk for falls, and not safe to be alone    IF patient discharges home will need the following DME: patient owns DME required for discharge       SUBJECTIVE:   Patient nonverbal during session.    OBJECTIVE DATA SUMMARY:     Past Medical History:   Diagnosis Date    Anorexia     Arthritis     Autistic disorder     Chronic mental illness      Ther. 2021 Apr 4;101(4):ovkt717. doi: 10.1093/ptj/wxsq266. PMID: 43196605.  3. Beni TAYLOR, Esperanza SILVA, Gita S, Kevin PARKS, [place] NOVOA. Activity Measure for Post-Acute Care \"6-Clicks\" Basic Mobility Scores Predict Discharge Destination After Acute Care Hospitalization in Select Patient Groups: A Retrospective, Observational Study. Arch Rehabil Res Clin Transl. 2022 Jul 16;4(3):269042. doi: 10.1016/j.arrct.2022.399227. PMID: 03212628; PMCID: [id number].  4. Jocelin MERAZ, Griselda S, Higinio W, Sharmaine P. AM-PAC Short Forms Manual 4.0. Revised 2/2020.                                                                                                                                                                                                                              Pain Rating:  No nonverbal pain behaviors noted      Activity Tolerance:   Good, Fair , and requires rest breaks    After treatment:   Patient left in no apparent distress sitting up in chair, Call bell within reach, Bed/ chair alarm activated, and Heels elevated for pressure relief      COMMUNICATION/EDUCATION:   The patient's plan of care was discussed with: occupational therapist and registered nurse         Thank you for this referral.  Mady Motta, PT, DPT, NCS  Minutes: 19      Physical Therapy Evaluation Charge Determination   History Examination Presentation Decision-Making   HIGH Complexity :3+ comorbidities / personal factors will impact the outcome/ POC  MEDIUM Complexity : 3 Standardized tests and measures addressin body structure, function, activity limitation and / or participation in recreation  LOW Complexity : Stable, uncomplicated  AM-PAC  MEDIUM     Based on the above components, the patient evaluation is determined to be of the following complexity level: Low

## 2024-03-15 NOTE — PROCEDURES
[facility]              [address] [place] [address]                                   EEG      PATIENT NAME: DERRICK CHU            : 1990  MED REC NO: [id number]                       ROOM: 128  ACCOUNT NO: [id number]                       ADMIT DATE: 2024  PROVIDER: Maurice Carpio MD    DATE OF SERVICE:  03/15/2024    REFERRING PHYSICIAN:  KAIA VAUGHN    INDICATION:  The patient is a 33-year-old female with history of multiple seizures in a patient with mental impairment and known history of seizures.  The patient is status post vagus nerve stimulator.  The patient on Depakote and Vimpat.  EEG to rule out seizures without focal abnormality.    EEG CLASSIFICATION:  The patient has dysrhythmia grade 2, generalized.    DESCRIPTION OF RECORD:  Background of this recording contains a posteriorly located occipital alpha rhythm of 8 to 9 Hz that is somewhat poorly formed, but does attenuate some with eye opening.  There was a moderate increase in generalized 2 to 6 Hz activity seen throughout the recording with no clear areas of focal slowing and no clear spike or spike-and-wave discharges were recorded.  There were no clear areas of focal slowing, no reported electrographic or dysrhythmic spells seen in this recording.  The patient did enter some brief stage of sleep with K complexes and sleep spindles seen in central head regions.  There was some movement and muscle and electrode artifact at times.  Photic stimulation produced minimal driving response.  Hyperventilation was not performed.    INTERPRETATION:    1. This is a moderately abnormal electroencephalogram due to the mild generalized slowing seen, but no clear areas of focal slowing.  2. No clear spike or spike-and-wave discharges were seen.  3. No recorded electrographic or dysrhythmic spells were seen in this recording.  4. Clinical correlation recommended.        MAURICE CARPIO

## 2024-03-15 NOTE — PLAN OF CARE
Problem: Discharge Planning  Goal: Discharge to home or other facility with appropriate resources  Outcome: Progressing  Flowsheets (Taken 3/14/2024 2237 by Darling Pelayo RN)  Discharge to home or other facility with appropriate resources: (Pt U/A to follow directions.) --     Problem: Skin/Tissue Integrity  Goal: Absence of new skin breakdown  Description: 1.  Monitor for areas of redness and/or skin breakdown  2.  Assess vascular access sites hourly  3.  Every 4-6 hours minimum:  Change oxygen saturation probe site  4.  Every 4-6 hours:  If on nasal continuous positive airway pressure, respiratory therapy assess nares and determine need for appliance change or resting period.  Outcome: Progressing     Problem: Pain  Goal: Verbalizes/displays adequate comfort level or baseline comfort level  Outcome: Progressing     Problem: ABCDS Injury Assessment  Goal: Absence of physical injury  Outcome: Progressing     Problem: Safety - Adult  Goal: Free from fall injury  Outcome: Progressing

## 2024-03-15 NOTE — PROGRESS NOTES
OCCUPATIONAL THERAPY EVALUATION/DISCHARGE  Patient: Monica Guajardo (33 y.o. female)  Date: 3/15/2024  Primary Diagnosis: Breakthrough seizure (HCC) [G40.919]         Precautions: Bed Alarm, Fall Risk                  ASSESSMENT :  Based on the objective data below, the patient is functioning at her baseline. She required min x2- mod A to transfer from bed to chair. Able to sit unsupported at EOB, required total A to don socks. Pt is at her baseline, recommend to return home with continued assistance.    Functional Outcome Measure:  The patient scored 10/100 on the Barthel Index outcome measure which is indicative of totally dependent.      Further skilled acute occupational therapy is not indicated at this time.     PLAN :    Recommendation for discharge: (in order for the patient to meet his/her long term goals): No skilled occupational therapy    Other factors to consider for discharge:     IF patient discharges home will need the following DME: patient owns DME required for discharge     SUBJECTIVE:   Patient stated, “.”    OBJECTIVE DATA SUMMARY:     Past Medical History:   Diagnosis Date    Anorexia     Arthritis     Autistic disorder     Chronic mental illness     Dehydration     Dental caries     Encephalopathy     Epilepsy (HCC)     last Seizure 5/2019 x2    Hypertension     Ill-defined condition     Autism    Intellectual disability     Muscle weakness     Ovarian cyst     Psychotic disorder (HCC)     Status epilepticus (HCC) 12/7/2015    Status post VNS (vagus nerve stimulator) placement     Vasospasm (HCC)      Past Surgical History:   Procedure Laterality Date    NEUROLOGICAL SURGERY Left     vagal nerve stimulator in L arm    OTHER SURGICAL HISTORY  05/31/2019    Dysfunction of Vagus Nerve Stimulator Generator (low battery), Refractory Seizure Disorder       Prior Level of Function/Environment/Context:   , ADL Assistance: Needs assistance,  ,  ,  ,  ,  , Homemaking Assistance: Needs assistance,

## 2024-03-15 NOTE — DISCHARGE INSTRUCTIONS
Seizure: Care Instructions  Overview     Seizures are caused by abnormal patterns of electrical signals in the brain. They are different for each person.  Seizures can affect movement, speech, vision, or awareness. Some people have only slight shaking of a hand and do not pass out. Other people may pass out and have shaking of the whole body. Some people appear to stare into space. They are awake, but they can't respond normally. Later, they may not remember what happened.  You may need tests to identify the type and cause of the seizures.  A seizure may occur only once, or you may have them more than one time. Taking medicines as directed and following up with your doctor may help keep you from having more seizures.  The doctor has checked you carefully, but problems can develop later. If you notice any problems or new symptoms, get medical treatment right away.  Follow-up care is a key part of your treatment and safety. Be sure to make and go to all appointments, and call your doctor if you are having problems. It's also a good idea to know your test results and keep a list of the medicines you take.  How can you care for yourself at home?  Be safe with medicines. Take your medicines exactly as prescribed. Call your doctor if you think you are having a problem with your medicine.  Do not do any activity that could be dangerous to you or others until your doctor says it is safe to do so. For example, do not drive a car, operate machinery, swim, or climb ladders.  Be sure that anyone treating you for any health problem knows that you have had a seizure and what medicines you are taking for it.  Identify and avoid things that may make you more likely to have a seizure. These may include lack of sleep, alcohol or drug use, stress, or not eating.  If possible, take a shower instead of a bath. Having a seizure while in a bath can increase the risk of drowning.  When should you call for help?   Call 911 anytime you

## 2024-03-15 NOTE — CONSULTS
0.0  0.00 - 0.04 K/UL Final    Differential Type 03/15/2024 AUTOMATED    Final       Radiographic Studies: CT scan of the head yesterday at the outside hospital was without significant abnormalities reported.    Electrophysiologic Studies: EEG earlier this morning just showed mild to moderate generalized slowing.      Portions of this document were created using Dragon dictation software and may contain inadvertent transcription errors.

## 2024-03-15 NOTE — DISCHARGE SUMMARY
Discharge Summary    Name: Monica Guajardo  [id number]  YOB: 1990 (Age: 33 y.o.)   Date of Admission: 3/14/2024  Date of Discharge: 3/15/2024  Attending Physician: Betsy Lawson MD    Discharge Diagnosis:   Breakthrough seizures  Epilepsy  Lennox-Gastaut syndrome  Cerebral palsy  Autism  PEG tube dependence  Otitis media with ruptured TM on left  Consultations:  IP CONSULT TO PHARMACY  IP CONSULT TO NEUROLOGY  IP CONSULT TO PHARMACY      Brief Admission History/Reason for Admission Per Tc Dobbins, DO:   Monica Guajardo is a 33 y.o.  female with PMHx as listed below presenting as transfer from OSH for multiple seizures in setting of known epilepsy with Lennox-Gastaut and implanted vagal nerve stimulator.  Currently treated with lacosamide and valproic acid with supratherapeutic valproic acid level at OSH.  Patient had 3 breakthrough seizures prior to presentation emergency department and per aide at bedside had 6 further episodes the majority of which were brief lasting less than 15 seconds.  She was given total of 3 mg Ativan, 2 g Keppra, 1 L normal saline at OSH.  Transferred to our facility for neurology evaluation and adjustment.     On arrival, patient very somnolent and withdrawn.  At baseline patient is minimally able to interact and reportedly articulates very few words per aide.  Hemodynamically stable and without any apparent seizure activity.    Brief Hospital Course by Main Problems:   Breakthrough seizures  Epilepsy  Lennox-Gastaut syndrome  Cerebral palsy  Autism  PEG tube dependence  Otitis media with ruptured TM on left       Seizure precautions  S/p EEG  Neurology consulted, greatly appreciate their expertise  -Defer advanced imaging to neurology preference noting vagal stimulator in place which likely precludes MRI, CT head at OSH unremarkable  Continue PTA lacosamide, hold valproic acid  -Valproic acid level supratherapeutic at 145 at    Home with Family:       Home with HH/PT/OT/RN:    SNF/LTC:    JOVITA:    OTHER:            Code status:   Recommended diet: regular diet  Recommended activity: activity as tolerated  Wound care: See surgical/procedure care instructions      Follow up with:   PCP : Maxine Cid APRN - NP Shulman, Wendy S, APRN - NP  [address] [facility] [place] [address] [phone]    Follow up      Kenn Medrano MD  [address] [address] [phone]    Follow up in 2 week(s)            Total time in minutes spent coordinating this discharge (includes going over instructions, follow-up, prescriptions, and preparing report for sign off to her PCP) :  35 minutes

## 2024-03-15 NOTE — CARE COORDINATION
1042 - Per IDRs, possible d/c today. Plan to return to group home, caregivers to transport. SMAARTER tool completed and on door frame. Pt is clear from CM for d/c once medically ready.     1208 - Contacted group home, [facility], to speak with caregiver. They are available to transport at time of discharge, will need DC summary faxed to [phone] when available. Caregiver can be reached at [phone] when transport is needed.     Stacey Pink, ACE  Care Management  [facility]  [phone]

## 2024-03-15 NOTE — PROGRESS NOTES
Patient is EEG just shows mild to moderate generalized slowing, and no focal abnormality or spike or spike and wave discharges or recorded seizures

## 2024-03-15 NOTE — PROGRESS NOTES
Pharmacy Medication Reconciliation     The patient was interviewed regarding current PTA medication list, use and drug allergies;  patient present in room and obtained permission from patient to discuss drug regimen with visitor(s) present. The patient was questioned regarding use of any other inhalers, topical products, over the counter medications, herbal medications, vitamin products or ophthalmic/nasal/otic medication use.     Allergy Update: Patient has no known allergies.    Recommendations/Findings:   The following amendments were made to the patient's active medication list on file at [facility]:   1) Additions: clonazepam    2) Deletions: None    3) Changes: changed clobazam dosing and lacosamide dosage form (to tablets)      Pertinent Findings: See above     Clarified PTA med list with patients group home records. PTA medication list was corrected to the following:     Prior to Admission Medications   Prescriptions Last Dose Informant   CLONAZEPAM ODT PO 3/14/2024    Sig: Take 1 mg by mouth 2 times daily Max Daily Amount: 2 mg   Cholecalciferol (VITAMIN D) 50 MCG ( UT) CAPS capsule 3/14/2024    Sig: Take 1 capsule by mouth daily   amoxicillin-clavulanate (AUGMENTIN) 875-125 MG per tablet 3/14/2024    Sig: Take 1 tablet by mouth 2 times daily For 10 days   cloBAZam (ONFI) 2.5 MG/ML SUSP suspension 3/14/2024    Si mLs by Per G Tube route 2 times daily.   docusate sodium (COLACE) 100 MG capsule 3/14/2024    Sig: Take 1 capsule by mouth daily   famotidine (PEPCID) 20 MG tablet 3/14/2024    Si tablet by PEG Tube route 2 times daily as needed   lacosamide (VIMPAT) 200 MG tablet 3/14/2024    Si tablet by PEG Tube route 2 times daily.   polyethylene glycol (GLYCOLAX) 17 g packet 3/14/2024    Sig: Take 1 packet by mouth every 3 days   valproate (DEPAKENE) 250 MG/5ML oral solution 3/14/2024    Si mLs by Per G Tube route 2 times daily Morning and noon   valproic acid (DEPAKENE) 250 MG/5ML SOLN oral

## 2024-03-15 NOTE — PROGRESS NOTES
End of Shift Note    Bedside shift change report given to Darling RN(oncoming nurse) by Racquel Reich RN  Report included the following information       Shift worked: 7a-7p   Shift summary and any significant changes:     Patient is cleared for discharge. Her group home is sending transport for her. They should arrive around 8pm. Discharge paperwork was faxed to them at [phone]       Concerns for physician to address:  None   Zone phone for oncoming shift:   [phone]     Patient Information  Monica Guajardo  33 y.o.  3/14/2024  8:04 PM by Tc Dobbins DO. Monica Guajardo was admitted from [facility]    Problem List  Patient Active Problem List    Diagnosis Date Noted    Complex partial seizures evolving to generalized tonic-clonic seizures (HCC) 03/15/2024    Acute alteration in mental status 03/15/2024    Breakthrough seizure (HCC) 03/14/2024    Localization-related symptomatic epilepsy and epileptic syndromes with complex partial seizures, intractable, without status epilepticus (HCC) 03/09/2017    Debility 01/05/2016    Increased oropharyngeal secretions 01/05/2016    Weakness 01/05/2016    Acute respiratory failure (HCC) 12/21/2015    Altered mental status 12/21/2015    Epilepsy (HCC)     Intellectual disability     Transaminitis 12/12/2015    Acute respiratory failure with hypoxia (HCC) 12/10/2015    Status epilepticus (HCC) 12/07/2015    Hypertension 12/07/2015    Aspiration into airway 12/07/2015    Status post VNS (vagus nerve stimulator) placement 12/07/2015    SIRS (systemic inflammatory response syndrome) (HCC) 12/07/2015    Pneumonia 12/07/2015    Autistic disorder     Complex dental caries 10/01/2015    Vasospasm (HCC)     Ovarian cyst     Encephalopathy      Past Medical History:   Diagnosis Date    Anorexia     Arthritis     Autistic disorder     Chronic mental illness     Dehydration     Dental caries     Encephalopathy     Epilepsy (HCC)     last Seizure 5/2019 x2    Hypertension     Ill-defined

## 2024-03-15 NOTE — H&P
Lovenox  GI Prophylaxis: Pepcid  Baseline: Long-term care    Subjective:   CHIEF COMPLAINT: Breakthrough seizures    HISTORY OF PRESENT ILLNESS:     Monica Guajardo is a 33 y.o.  female with PMHx as listed below presenting as transfer from OSH for multiple seizures in setting of known epilepsy with Lennox-Gastaut and implanted vagal nerve stimulator.  Currently treated with lacosamide and valproic acid with supratherapeutic valproic acid level at OSH.  Patient had 3 breakthrough seizures prior to presentation emergency department and per aide at bedside had 6 further episodes the majority of which were brief lasting less than 15 seconds.  She was given total of 3 mg Ativan, 2 g Keppra, 1 L normal saline at OSH.  Transferred to our facility for neurology evaluation and adjustment.    On arrival, patient very somnolent and withdrawn.  At baseline patient is minimally able to interact and reportedly articulates very few words per aide.  Hemodynamically stable and without any apparent seizure activity.    We were asked to admit for work up and evaluation of the above problems.     Past Medical History:   Diagnosis Date    Anorexia     Arthritis     Autistic disorder     Chronic mental illness     Dehydration     Dental caries     Encephalopathy     Epilepsy (HCC)     last Seizure 5/2019 x2    Hypertension     Ill-defined condition     Autism    Intellectual disability     Muscle weakness     Ovarian cyst     Psychotic disorder (HCC)     Status epilepticus (HCC) 12/7/2015    Status post VNS (vagus nerve stimulator) placement     Vasospasm (HCC)         Past Surgical History:   Procedure Laterality Date    NEUROLOGICAL SURGERY Left     vagal nerve stimulator in L arm    OTHER SURGICAL HISTORY  05/31/2019    Dysfunction of Vagus Nerve Stimulator Generator (low battery), Refractory Seizure Disorder       Social History     Tobacco Use    Smoking status: Never    Smokeless tobacco: Never   Substance Use Topics    Alcohol  any previous visit (from the past 12 hour(s)).      CT Result (most recent):  CT CERVICAL SPINE WO CONTRAST 08/29/2023    Narrative  EXAM DESCRIPTION:  CT CERVICAL SPINE WO CONTRAST    ACCESSION:  [id number]    COMPLETED DATE/TIME:  8/29/2023 3:37 pm    REASON FOR EXAM:  fall head injury    COMPARISON:  None    TECHNIQUE:  3 mm axial images CT of the cervical spine are obtained without IV contrast.  Coronal and sagittal reformatted images are also obtained.    All CT scans performed at  facilities are performed using dose optimization techniques as appropriate to exam including the following: Automated exposure control, adjustment of the mA or kVp according to patient size, and the use of iterative reconstruction techniques.    REPORT:  Vertebral body heights are maintained without evidence of compression deformity.  There is mild narrowing of the intervertebral disk space of C5-C6.  There is a kyphosis of cervical spine, which may be due to patient positioning or soft tissue injury.  There is no evidence of acute fracture, dislocation, or subluxation.  Prevertebral soft tissue appears normal.    Impression  Mild kyphosis of cervical spine may be due to patient positioning or soft tissue injury.        Digital Imaging and Communications in Medicine (DICOM) format image data are available to nonaffiliated external healthcare facilities or entities on a secure, media free, reciprocally searchable basis with patient authorization for at least a 12-month period after this study.        INTERPRETING PHYSICIAN: Lilibeth Bains MD  YL//Report ID: [id number]    Creation Date: 08/29/23 3:52 pm        Xray Result (most recent):  XR CHEST LIMITED ONE VIEW 03/14/2024    Narrative  EXAM DESCRIPTION:  XR CHEST 1 VW    ACCESSION:  [id number]    COMPLETED DATE/TIME:  3/14/2024 10:58 am    REASON FOR EXAM:  Possible aspiration risk, multiple seizures.    COMPARISON:  08/15/2023    TECHNIQUE:  Single view of

## 2024-03-15 NOTE — PROGRESS NOTES
Speech LAnguage Pathology EVALUATION/DISCHARGE    Patient: Monica Guajardo (33 y.o. female)  Date: 3/15/2024  Primary Diagnosis: Breakthrough seizure (HCC) [G40.919]       Precautions: Aspiration precautions, Bed Alarm, Fall Risk                  ASSESSMENT :  Based on the objective data described below, the patient presents with presumed baseline oropharyngeal swallow function. Patient initially resistant to participating with SLP when food presented by clinician, however when allowed to self-feed given increased time, patient demonstrated her ability to do so. Mastication assessed to be grossly functional. Patient refused to drink water and juice options to drink. When discussed with patient's  after initial evaluation, patient only drinks milk per report. Suspect patient is at her presumed baseline oropharyngeal swallow function. Will complete SLP orders at this time.    Patient will be discharged from skilled speech-language pathology services at this time.     PLAN :  Recommendations and Planned Interventions:  Diet: Regular and thin liquids with finger foods as is patient's baseline  -- Patient typically only drinks milk  -- PEG for medication and supplementary nutrition as needed  -- Assistance with meal set-up  -- Aspiration precautions: upright for all PO, small bites/sips, slow rate of intake, single sips (1 at a time), alternate liquids and solids as needed, ensure oral cavity clearance after meals       Acute SLP Services: No, patient will be discharged from acute skilled speech-language pathology at this time.    Discharge Recommendations: No, additional SLP treatment not indicated at discharge     SUBJECTIVE:   Patient stated, “no.”    OBJECTIVE:     Past Medical History:   Diagnosis Date    Anorexia     Arthritis     Autistic disorder     Chronic mental illness     Dehydration     Dental caries     Encephalopathy     Epilepsy (HCC)     last Seizure 5/2019 x2    Hypertension      problems            Respiratory Status/Airway:  Room air                           Functional Oral Intake Scale (FOIS): 7--Total oral diet with no restrictions    After treatment:   Patient left in no apparent distress sitting up in chair, Call bell left within reach, and Nursing notified    COMMUNICATION/EDUCATION:   Patient was educated regarding role of SLP. She demonstrated guarded understanding due to history of ID and nonverbal status.    The patient's plan of care including recommendations, planned interventions, and recommended diet changes were discussed with: Registered nurse    Patient/family have participated as able in goal setting and plan of care and Patient/family agree to work toward stated goals and plan of care    Thank you,  Garrett Martin SLP  Minutes: 10

## 2024-03-15 NOTE — PROGRESS NOTES
End of Shift Note    Bedside shift change report given to Racquel Reich RN(oncoming nurse) by Darling Pelayo RN  Report included the following information       Shift worked: Nights   Shift summary and any significant changes:     New admit. Pt arrived last night. Caregiver not at bedside. Pt nonverbal. VSS. Dried blood in both ears. MD notified. Gave ear drops. Possible need for ENT consult.        Concerns for physician to address:  Above.   Zone phone for oncoming shift:   [phone]     Patient Information  Monica Guajardo  33 y.o.  3/14/2024  8:04 PM by Tc Dobbins DO. Monica Guajardo was admitted from [facility]    Problem List  Patient Active Problem List    Diagnosis Date Noted    Breakthrough seizure (HCC) 03/14/2024    Localization-related symptomatic epilepsy and epileptic syndromes with complex partial seizures, intractable, without status epilepticus (HCC) 03/09/2017    Debility 01/05/2016    Increased oropharyngeal secretions 01/05/2016    Weakness 01/05/2016    Acute respiratory failure (HCC) 12/21/2015    Altered mental status 12/21/2015    Epilepsy (HCC)     Intellectual disability     Transaminitis 12/12/2015    Acute respiratory failure with hypoxia (HCC) 12/10/2015    Status epilepticus (HCC) 12/07/2015    Hypertension 12/07/2015    Aspiration into airway 12/07/2015    Status post VNS (vagus nerve stimulator) placement 12/07/2015    SIRS (systemic inflammatory response syndrome) (HCC) 12/07/2015    Pneumonia 12/07/2015    Autistic disorder     Complex dental caries 10/01/2015    Vasospasm (HCC)     Ovarian cyst     Encephalopathy      Past Medical History:   Diagnosis Date    Anorexia     Arthritis     Autistic disorder     Chronic mental illness     Dehydration     Dental caries     Encephalopathy     Epilepsy (HCC)     last Seizure 5/2019 x2    Hypertension     Ill-defined condition     Autism    Intellectual disability     Muscle weakness     Ovarian cyst     Psychotic disorder (HCC)     Status

## 2024-06-04 NOTE — PROGRESS NOTES
kg/m².    Physical Exam:  General:  Well developed, well nourished, and groomed individual in no acute distress, she is sitting in her wheelchair.  She is holding a magazine and flipping the pages.  She makes eye contact but is not verbal with me.  HEENT: normocephalic  Neck: trach is midline neck is supple.  Cardiovascular: Regular rate and rhythm  Respiratory: Equal bilateral chest expansion, nonlabored respirations    NEUROLOGICAL EXAMINATION:     Mental Status:   There is awake, she is alert.  She does make eye contact.  She is verbal at baseline, but was not verbal during today's visit.  No symptoms to suggest clinical or subclinical seizures.    Cranial Nerves:  I: smell Not tested   II: visual fields Unable to reliably assess   II: pupils 4mm, Equal, round, reactive to light, and accommodate   II: optic disc Not assessed   III,VII: ptosis None noted   III,IV,VI: extraocular muscles  Full ROM, gaze is conjugate, no nystagmus is noted   V: mastication She is producing no speech, caregiver states she swallows without difficulty   V: facial light touch sensation  Unable to reliably assess   VII: facial muscle function   Appears to be symmetric   VIII: hearing Appears to be symmetric   IX: soft palate elevation  Unable to assess   XI: trapezius strength  Unable to reliably assess   XI: sternocleidomastoid strength Appears to be intact bilaterally   XI: neck flexion strength  Appears to be intact   XII: tongue  Unable to reliably assess    -Motor: Due to patient's nonparticipatory motor function testing is somewhat limited.  She appears to have strong purposeful movements of the upper extremities.  She has appropriate tone of the bilateral lower extremities and at baseline can ambulate.   -Sensation: Unable to reliably assess   -Cerebellar Testing: Unable to reliably assess   -Gait: Per the caregiver she can ambulate with walker and assistance    I spent at least 30 minutes on this visit with this established

## 2024-06-05 ENCOUNTER — PROCEDURE VISIT (OUTPATIENT)
Age: 34
End: 2024-06-05
Payer: MEDICARE

## 2024-06-05 ENCOUNTER — OFFICE VISIT (OUTPATIENT)
Age: 34
End: 2024-06-05
Payer: MEDICARE

## 2024-06-05 VITALS
HEIGHT: 65 IN | DIASTOLIC BLOOD PRESSURE: 68 MMHG | SYSTOLIC BLOOD PRESSURE: 102 MMHG | RESPIRATION RATE: 16 BRPM | BODY MASS INDEX: 23.3 KG/M2 | TEMPERATURE: 97.7 F

## 2024-06-05 DIAGNOSIS — G40.209 COMPLEX PARTIAL SEIZURES EVOLVING TO GENERALIZED TONIC-CLONIC SEIZURES (HCC): ICD-10-CM

## 2024-06-05 DIAGNOSIS — G40.919 BREAKTHROUGH SEIZURE (HCC): Primary | ICD-10-CM

## 2024-06-05 DIAGNOSIS — Z96.89 STATUS POST VNS (VAGUS NERVE STIMULATOR) PLACEMENT: Primary | ICD-10-CM

## 2024-06-05 PROCEDURE — G8420 CALC BMI NORM PARAMETERS: HCPCS | Performed by: NURSE PRACTITIONER

## 2024-06-05 PROCEDURE — 99203 OFFICE O/P NEW LOW 30 MIN: CPT | Performed by: NURSE PRACTITIONER

## 2024-06-05 PROCEDURE — 95970 ALYS NPGT W/O PRGRMG: CPT | Performed by: PSYCHIATRY & NEUROLOGY

## 2024-06-05 PROCEDURE — 1036F TOBACCO NON-USER: CPT | Performed by: NURSE PRACTITIONER

## 2024-06-05 PROCEDURE — 3078F DIAST BP <80 MM HG: CPT | Performed by: NURSE PRACTITIONER

## 2024-06-05 PROCEDURE — 3074F SYST BP LT 130 MM HG: CPT | Performed by: NURSE PRACTITIONER

## 2024-06-05 PROCEDURE — G8427 DOCREV CUR MEDS BY ELIG CLIN: HCPCS | Performed by: NURSE PRACTITIONER

## 2024-06-05 RX ORDER — ACETAMINOPHEN 500 MG
1000 TABLET ORAL EVERY 6 HOURS PRN
COMMUNITY

## 2024-06-05 RX ORDER — BISACODYL 10 MG
10 SUPPOSITORY, RECTAL RECTAL DAILY
COMMUNITY

## 2024-06-05 RX ORDER — ALOE VERA
1 GEL (GRAM) TOPICAL PRN
COMMUNITY
Start: 2023-04-03

## 2024-06-05 RX ORDER — LORATADINE 10 MG/1
10 TABLET ORAL DAILY
COMMUNITY
Start: 2019-05-01

## 2024-06-05 RX ORDER — DEXTROMETHORPHAN HYDROBROMIDE, GUAIFENESIN 20; 200 MG/20ML; MG/20ML
10 SOLUTION ORAL EVERY 6 HOURS PRN
COMMUNITY
Start: 2023-03-30

## 2024-06-05 ASSESSMENT — PATIENT HEALTH QUESTIONNAIRE - PHQ9
2. FEELING DOWN, DEPRESSED OR HOPELESS: NOT AT ALL
1. LITTLE INTEREST OR PLEASURE IN DOING THINGS: NOT AT ALL
SUM OF ALL RESPONSES TO PHQ QUESTIONS 1-9: 0
SUM OF ALL RESPONSES TO PHQ9 QUESTIONS 1 & 2: 0
SUM OF ALL RESPONSES TO PHQ QUESTIONS 1-9: 0

## 2024-06-05 NOTE — PATIENT INSTRUCTIONS
Monica, it was a pleasure seeing you in clinic today.    Sounds as if your seizure frequency is back to baseline, you have had 4 since you were admitted in March.    VNS was interrogated, your battery life is at 20 to 50%.  We made no adjustments to the VNS settings.    I will make no changes to the dosing of your antiepileptic medications at this time.    Continue following with Dr. Santos in [place], I will also forward your chart on to Dr. Wilhelm's office to get your appointment rescheduled.

## 2024-06-05 NOTE — PROGRESS NOTES
[facility]  [facility]      VNS evaluation interrogation and/or programming adjustments      Rogelio BOSWELL S/N: [id number]  Implant date: July 20, 2023    Last known VNS interrogation: 4/11/2024    Patient is here today for evaluation of VNS regarding parameters and need for adjustments to programming     # of Programming adjustments made today's visit: 0    VNS was found to be operating normally with 25-50% battery charge  At this time patient does not need to be referred to neurosurgery for adjustment of equipment or battery replacement       Patient /caregiver denies common side effects related to VNS to include:  Cough  Hoarseness or changes in voice tone  Prickly feelings in the skin or paresthesias  Shortness of breath  Sore throat/dysphagia             Detection since last interrogation              Heart rate detection    Enabled              Heartbeat detection    3     Lead impedance: 136 5 ohms  Average Normal stim / day: 1024.46  Magnet stims per day since last visit: 0.24  Auto stim events per day since last visit: 4.46           Patient's daytime and nighttime parameters are the same  Baseline settings at today's visit:  After Adjustment [only values changed will be noted]   Output  3.5 mA    Frequency  30 Hz    Pulse width  750 uSec    On time  14 Seconds    Off time  1.1 Minutes    Duty cycle       23 %        Auto stim    Output   3.25 mA    Pulse width  1000 uSec    On time  30 second        Magnet    Output  3.5 mA    Pulse width  750 uSec    On time  14 second        Autostim Threshold  40 %    Low heart rate threshold         Off    Prone position detection         disabled      1. Status post VNS (vagus nerve stimulator) placement  Assessment & Plan:  VNS was interrogated no adjustments were made.  Patient does not have a high degree of activity on the VNS.  This was just replaced about a year ago and battery power is already 25 to 50%.  Settings are quite

## 2024-06-05 NOTE — ASSESSMENT & PLAN NOTE
VNS was interrogated no adjustments were made.  Patient does not have a high degree of activity on the VNS.  This was just replaced about a year ago and battery power is already 25 to 50%.  Settings are quite high.    Patient was referred to Dr. Wilhelm by her prior neurologist.  That appointment was canceled as Dr. Wilhelm will be out of the office that appointment was initially for the end of June 2024.  To the best that I can tell this has not been rescheduled.  For now patient will go back under the care of her routine neurologist out of [place]

## 2024-06-05 NOTE — PATIENT INSTRUCTIONS
As a reminder:   Please come to your appointment 15 minutes before your office appointment.  This way, you can get checked in at the  and checked in by the nursing staff so you have the full allotment of time with your provider for your visit.  Please bring an up-to-date and accurate list of all your medications.  Or bring all your active prescription bottles with you at the time of your office visit and this includes over-the-counter medications so we can make sure that your medication list is up-to-date.  If you are scheduled for a virtual visit, please be aware that the  will need to check you in and usually the day before to verify insurance and collect co-pays as appropriate.  Please be prepared for the second call which will be from the nurse to go over your medications and any other vital information.  This will probably be done 30 minutes prior to your visit.  The reason why we do this early is that you can get the full benefit of your appointment time with your provider.  Finally you will be given the link for your virtual visit please click into your link 10 minutes prior to your appointment and please wait patiently for the provider to join you                      Office Policies    Phone calls/patient messages:  Please allow up to 72 hours  for someone in the office to contact you about your call or message. Be mindful your provider may be out of the office or your message may require further review. We encourage you to use [employer] for your messages as this is a faster, more efficient way to communicate with our office    Medication Refills:  Prescription medications require up to 48 business hours to process. We encourage you to use [employer] for your refills.     For controlled medications: Please allow up to 72 business hours to process. Certain medications may require you to  a written prescription at our office.    NO narcotic/controlled medications will be prescribed after

## 2024-06-06 NOTE — ASSESSMENT & PLAN NOTE
Patient with a history of cerebral palsy, Lennox Gestalt syndrome, epilepsy with breakthrough seizures, and has a vagal nerve stimulator, autism and is PEG tube dependent for medications, but can chew and swallow.  Patient was admitted to the hospital 3/14-3/15/2024 due to multiple seizures.  Patient had 3 breakthrough seizures prior to presentation to the emergency department and at the bedside had 6 further episodes with seizures lasting less than 15 seconds.  She was given a total of 3 mg of Ativan, 2 g of Keppra and a liter of normal saline.  Patient was evaluated by Dr. Medrano with the neurology team, there was not a source of multiple seizures identified.  Current medication regimen of Onfi, Depakene, Vimpat, and clonazepam was continued.  Patient also has rectal Valium which can be administered x 1 for seizure lasting over 2 minutes.    Patient is back to her baseline, since her admission in March she has had 4 seizures which did not require hospitalization.    Her VNS is effective when the magnet is used and normally brings her \"right out\" of his seizure.  VNS was interrogated negative is a 25 to 50%.  No adjustments were made to the settings  We will continue her current medication regimen of Onfi, clonazepam, Vimpat, and Depakene  Continue rectal procedures testing for 2 minutes.  Per the group home guidelines if patient has more than 2 seizures in a 12-hour.  She needs to go to the emergency department.  Patient's primary neurologist, Dr. Ibrahim with neurology in [place], and referred to Dr. Wilhelm for epilepsy evaluation and recommendations due to frequency of breakthrough seizures.  That appointment was canceled, will forward on the chart so we can get that appointment rescheduled.

## 2024-07-26 ENCOUNTER — TELEPHONE (OUTPATIENT)
Age: 34
End: 2024-07-26

## 2024-07-26 NOTE — TELEPHONE ENCOUNTER
Sissy requesting a call to see if the patient can get a sooner appt than 9/16/24    The reason because the patient has been having a lot more seizures and her VNS Battery is getting low.

## 2024-07-26 NOTE — TELEPHONE ENCOUNTER
Returned Sissy's call     Appt r/s for 8/12/24 arrival 11 am    Four seizures since June 5, one warranted ED visit as pt's postictal sx were prolonged.  Pt's current neurologist wanted pt to be evaluated by Dr. Wilhelm to see if he can manage her breakthrough seizures          VNS was interrogated by Pat Bianchi on 6/5/24     [employer]  S/N: [id number]  Implant date: July 20, 2023     Last known VNS interrogation: 4/11/2024     Patient is here today for evaluation of VNS regarding parameters and need for adjustments to programming      # of Programming adjustments made today's visit: 0     VNS was found to be operating normally with 25-50% battery charge  At this time patient does not need to be referred to neurosurgery for adjustment of equipment or battery replacement      .

## 2024-08-12 ENCOUNTER — OFFICE VISIT (OUTPATIENT)
Age: 34
End: 2024-08-12
Payer: MEDICARE

## 2024-08-12 VITALS
RESPIRATION RATE: 14 BRPM | SYSTOLIC BLOOD PRESSURE: 108 MMHG | HEART RATE: 78 BPM | BODY MASS INDEX: 23.43 KG/M2 | OXYGEN SATURATION: 98 % | DIASTOLIC BLOOD PRESSURE: 63 MMHG | HEIGHT: 65 IN | WEIGHT: 140.6 LBS

## 2024-08-12 DIAGNOSIS — G40.813 INTRACTABLE LENNOX-GASTAUT SYNDROME WITH STATUS EPILEPTICUS (HCC): Primary | ICD-10-CM

## 2024-08-12 PROCEDURE — 1036F TOBACCO NON-USER: CPT | Performed by: PSYCHIATRY & NEUROLOGY

## 2024-08-12 PROCEDURE — 99215 OFFICE O/P EST HI 40 MIN: CPT | Performed by: PSYCHIATRY & NEUROLOGY

## 2024-08-12 PROCEDURE — 3078F DIAST BP <80 MM HG: CPT | Performed by: PSYCHIATRY & NEUROLOGY

## 2024-08-12 PROCEDURE — G8420 CALC BMI NORM PARAMETERS: HCPCS | Performed by: PSYCHIATRY & NEUROLOGY

## 2024-08-12 PROCEDURE — G8428 CUR MEDS NOT DOCUMENT: HCPCS | Performed by: PSYCHIATRY & NEUROLOGY

## 2024-08-12 PROCEDURE — 3074F SYST BP LT 130 MM HG: CPT | Performed by: PSYCHIATRY & NEUROLOGY

## 2024-08-12 RX ORDER — CANNABIDIOL 100 MG/ML
5 SOLUTION ORAL 2 TIMES DAILY
Qty: 192 ML | Refills: 5 | Status: SHIPPED | OUTPATIENT
Start: 2024-08-12 | End: 2025-09-17

## 2024-08-12 RX ORDER — MEDROXYPROGESTERONE ACETATE 150 MG/ML
150 INJECTION, SUSPENSION INTRAMUSCULAR
COMMUNITY

## 2024-08-12 RX ORDER — DIAZEPAM 10 MG/2G
10 GEL RECTAL
COMMUNITY
End: 2024-08-12 | Stop reason: ALTCHOICE

## 2024-08-12 RX ORDER — DIAZEPAM 10 MG/100UL
SPRAY NASAL
Qty: 4 EACH | Refills: 1 | Status: SHIPPED | OUTPATIENT
Start: 2024-08-12 | End: 2026-08-12

## 2024-08-12 RX ORDER — CANNABIDIOL 100 MG/ML
SOLUTION ORAL
Qty: 142 ML | Refills: 0 | Status: SHIPPED | OUTPATIENT
Start: 2024-08-12 | End: 2024-09-09

## 2024-08-12 NOTE — PROGRESS NOTES
[facility] at [facility] Neurology Clinics at [address] [place] [address] [address] [place] [address] [phone]              Chief Complaint   Patient presents with    Seizures     VNS// Dr Santos, neurology in [place], [place] - last seen 7/22/24 - VNS checked // Here w/ Sissy, caregiver w/ [facility]      Current Outpatient Medications   Medication Sig Dispense Refill    diazePAM (DIASTAT) 10 MG GEL Place 10 mg rectally once as needed. Max Daily Amount: 10 mg      medroxyPROGESTERone (DEPO-PROVERA) 150 MG/ML injection Inject 1 mL into the muscle every 30 days      NONFORMULARY VNS implant      acetaminophen (TYLENOL) 500 MG tablet Take 2 tablets by mouth every 6 hours as needed for Pain or Fever      Aloe Vera GEL Apply 1 Units topically as needed      bisacodyl (DULCOLAX) 10 MG suppository Place 1 suppository rectally daily      COUGH & CHEST CONGESTION DM  MG/20ML SYRP 10 mLs every 6 hours as needed      loratadine (CLARITIN) 10 MG tablet Take 1 tablet by mouth daily      cloBAZam (ONFI) 2.5 MG/ML SUSP suspension 4 mLs by Per G Tube route 2 times daily.      docusate sodium (COLACE) 100 MG capsule Take 1 capsule by mouth daily      polyethylene glycol (GLYCOLAX) 17 g packet Take 1 packet by mouth every 3 days      valproic acid (DEPAKENE) 250 MG/5ML SOLN oral solution Take 7.5 mLs by mouth nightly      lacosamide (VIMPAT) 200 MG tablet 1 tablet by PEG Tube route 2 times daily.      CLONAZEPAM ODT PO Take 1 mg by mouth 2 times daily Max Daily Amount: 2 mg      valproate (DEPAKENE) 250 MG/5ML oral solution 5 mLs by Per G Tube route 2 times daily Morning and noon      famotidine (PEPCID) 20 MG tablet 1 tablet by PEG Tube route 2 times daily      valsartan (DIOVAN) 80 MG tablet 1 tablet by PEG Tube route daily      magnesium citrate solution Take 296 mLs

## 2024-08-13 ENCOUNTER — TELEPHONE (OUTPATIENT)
Age: 34
End: 2024-08-13

## 2024-08-13 NOTE — TELEPHONE ENCOUNTER
HIPAA Verified (if caller is someone other than patient): n/a            Message: (as many details from patient/caller as possible)    Patient support program would like to know why patients script for medication Epidiolex was sent to [facility]?       Refill Information: Neurodiagnostic Testing: New patient referrals   Pharmacy:       Test Type: N/A   Reason for referral:     Medication name:  Reason for Test:   Has pt been seen by neuro in hospital or our offices within the last 3 yrs?  yes          Prescribing provider:    Ordering Provider:    Referring provider or self referral?     Last Office Visit (must be within last 12 months):     Referral Placed/Sent?: N/A  Referral placed in Epic or scanned in to media?  N/A          Level 1 Calls - attempted to reach practice? N/A     Reason Call Marked High Priority (if applicable):

## 2024-08-19 ENCOUNTER — TELEPHONE (OUTPATIENT)
Age: 34
End: 2024-08-19

## 2024-08-19 NOTE — TELEPHONE ENCOUNTER
Note sent to [facility] to look in to this since it had been sent     Disp Refills Start End    cannabidiol (EPIDIOLEX) 100 MG/ML oral solution 192 mL 5 8/12/2024 9/17/2025    Sig - Route: Take 3.2 mLs by mouth in the morning and at bedtime - Oral    Cosign for Ordering: Accepted by Lacey Wilhelm MD on 8/12/2024  2:25 PM

## 2024-08-23 NOTE — TELEPHONE ENCOUNTER
Return call to [facility] Patient Support Program and spoke with Janice advising that pt's Epidiolex has been sent to [facility].

## 2024-08-23 NOTE — TELEPHONE ENCOUNTER
Eladio requesting a call to discuss medication : Epidiolex    He stated contacted both [facility] and [facility] and the stated no prescription for this medication.    Both pharmacists stated they have contacted our office about not having this prescription.    He just wants to know where will our office be sending the prescription.

## 2024-09-04 ENCOUNTER — TELEPHONE (OUTPATIENT)
Age: 34
End: 2024-09-04

## 2024-09-04 NOTE — TELEPHONE ENCOUNTER
Called [facility].  Gave VO for Epidiolex as they need rx on file so they may print pt's MAR every month per facility protocol.    Also requested them inform pt's home that they need to contact [facility] to have them ship the medication as it is specialty and can't be filled by [facility].  (Requested [facility] let home know so they are aware of why it needs to be filled by another pharmacy and the appropriate protocol for specialty meds)

## 2024-09-04 NOTE — TELEPHONE ENCOUNTER
Requesting RX cannabidiol (EPIDIOLEX) 100 MG/ML oral solution to be sent to   [facility] -   [place], [place] - [address] - P [phone] -   F [phone]

## 2024-09-11 ENCOUNTER — TELEPHONE (OUTPATIENT)
Age: 34
End: 2024-09-11

## 2024-09-16 ENCOUNTER — TELEPHONE (OUTPATIENT)
Age: 34
End: 2024-09-16

## 2024-09-16 DIAGNOSIS — Z51.81 ENCOUNTER FOR MEDICATION MONITORING: ICD-10-CM

## 2024-09-16 DIAGNOSIS — G40.813 INTRACTABLE LENNOX-GASTAUT SYNDROME WITH STATUS EPILEPTICUS (HCC): Primary | ICD-10-CM

## 2024-09-30 DIAGNOSIS — G40.813 INTRACTABLE LENNOX-GASTAUT SYNDROME WITH STATUS EPILEPTICUS (HCC): ICD-10-CM

## 2024-10-01 RX ORDER — CANNABIDIOL 100 MG/ML
SOLUTION ORAL
Qty: 142 ML | Refills: 0 | OUTPATIENT
Start: 2024-10-01

## 2024-12-09 ENCOUNTER — OFFICE VISIT (OUTPATIENT)
Age: 34
End: 2024-12-09
Payer: MEDICARE

## 2024-12-09 VITALS — HEART RATE: 94 BPM | SYSTOLIC BLOOD PRESSURE: 111 MMHG | DIASTOLIC BLOOD PRESSURE: 77 MMHG | OXYGEN SATURATION: 96 %

## 2024-12-09 DIAGNOSIS — G40.813 INTRACTABLE LENNOX-GASTAUT SYNDROME WITH STATUS EPILEPTICUS (HCC): Primary | ICD-10-CM

## 2024-12-09 PROCEDURE — 95970 ALYS NPGT W/O PRGRMG: CPT | Performed by: PSYCHIATRY & NEUROLOGY

## 2024-12-09 NOTE — PROGRESS NOTES
Guy down at [facility] October 17, 2024 where her vagus nerve stimulator generator was replaced.    I saw the patient when she was hospitalized at [facility] January 2016.  She had Lennox-Gastaut and was in status epilepticus.  At that time she was on Depacon, Klonopin, Vimpat     [facility] note where patient was in status epilepticus.  At that time she was on Klonopin, Keppra, Vimpat, Lamictal and had VNS in place     Consultation note December 2015 where I saw the patient where she came in after having had cluster of seizures.     Consultation with Dr. Dumont at [facility] and the patient came in with increasing breakthrough seizures triggered by infection.  She was said to have frequent seizures at baseline having 10-15 episodes per month.  She was apparently awaiting to be taken to the OR to have a dental procedure done under general anesthesia when she had 3-4 seizures.  They were described as generalized shaking of the extremities and similar to her usual.     Consult with Dr. 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  She was said to have 4-5 breakthrough seizures a day prior.     Consult with Dr. Nestor Miranda December 1, 2022 where she had been admitted with cough question breakthrough seizures.  She was on Vimpat, Klonopin, Onfi, Depakote and as needed Ativan.     Office visit with Dr. Hinojosa at the [place] [facility] October 9, 2015 where she was said to have Lennox-Gastaut, static encephalopathy and intractable seizures.  She was on Lamictal Klonopin and Keppra.  She had been on Dilantin.  She had vagus nerve stimulator in place.     [place] [facility] visit with Dr. Bell dated June 19, 2015 and meds adjusted.     Diagnostics  [facility] dated July 18, 2024 for

## 2024-12-20 ENCOUNTER — TELEPHONE (OUTPATIENT)
Age: 34
End: 2024-12-20

## 2024-12-20 NOTE — TELEPHONE ENCOUNTER
Shania with pt's group home called this morning to report pt had 7 back to back sz, Valtoco administered, this morning.  Unsure if VNS magnet was swiped.  EMS transported pt to [facility] ED.  She states pt was sz in ambulance on the way to hospital.    They deny pt missed any medication doses     Current AEDs:    Epidiolex 100 mg/mL solution--3.2 mL twice daily (based off of 64 kg wt) (pt's wt fluctuates btw 61 kg and 66 kg)  Onfi 2.5 mg/mL suspension 4 mL twice daily  Klonopin ODT 1 mg twice daily  Vimpat 200 mg tablets--1 tablet twice daily  Depakene 250 mg/5 mL solution 5 mL 3 times daily and 7.5 mL nightly  Valtoco PRN rescue  VNS    Notified Shania that Dr. Wilhelm may not have any suggestions to add until after she is d/c from hospital as he would not know what they have ordered.  Will contact her back on Monday.  She states she will not be there but we may ask for either Viridiana or Melanie.  Number [phone]

## 2025-01-14 NOTE — TELEPHONE ENCOUNTER
S/w Melanie.  Relayed information per Dr. Wilhelm.  She is in agreeance that sz were caused by dislodged G-tube as pt has not had any further sz since hospital d/c.

## 2025-01-23 DIAGNOSIS — G40.813 INTRACTABLE LENNOX-GASTAUT SYNDROME WITH STATUS EPILEPTICUS (HCC): ICD-10-CM

## 2025-01-23 RX ORDER — DIAZEPAM 10 MG/100UL
SPRAY NASAL
Qty: 4 EACH | Refills: 1 | Status: SHIPPED | OUTPATIENT
Start: 2025-01-23

## 2025-03-10 ENCOUNTER — TELEPHONE (OUTPATIENT)
Age: 35
End: 2025-03-10

## 2025-03-10 DIAGNOSIS — G40.813 INTRACTABLE LENNOX-GASTAUT SYNDROME WITH STATUS EPILEPTICUS (HCC): ICD-10-CM

## 2025-03-10 RX ORDER — CANNABIDIOL 100 MG/ML
320 SOLUTION ORAL 2 TIMES DAILY
Qty: 192 ML | Refills: 5 | Status: SHIPPED | OUTPATIENT
Start: 2025-03-10 | End: 2025-03-10 | Stop reason: SDUPTHER

## 2025-03-10 RX ORDER — CANNABIDIOL 100 MG/ML
320 SOLUTION ORAL 2 TIMES DAILY
Qty: 192 ML | Refills: 5 | Status: ACTIVE | OUTPATIENT
Start: 2025-03-10 | End: 2026-04-15

## 2025-04-14 ENCOUNTER — OFFICE VISIT (OUTPATIENT)
Age: 35
End: 2025-04-14
Payer: MEDICARE

## 2025-04-14 VITALS
SYSTOLIC BLOOD PRESSURE: 118 MMHG | DIASTOLIC BLOOD PRESSURE: 76 MMHG | RESPIRATION RATE: 16 BRPM | OXYGEN SATURATION: 98 % | HEART RATE: 100 BPM

## 2025-04-14 DIAGNOSIS — G40.813 INTRACTABLE LENNOX-GASTAUT SYNDROME WITH STATUS EPILEPTICUS (HCC): ICD-10-CM

## 2025-04-14 PROCEDURE — G8427 DOCREV CUR MEDS BY ELIG CLIN: HCPCS | Performed by: PSYCHIATRY & NEUROLOGY

## 2025-04-14 PROCEDURE — 95970 ALYS NPGT W/O PRGRMG: CPT | Performed by: PSYCHIATRY & NEUROLOGY

## 2025-04-14 PROCEDURE — 3074F SYST BP LT 130 MM HG: CPT | Performed by: PSYCHIATRY & NEUROLOGY

## 2025-04-14 PROCEDURE — 99214 OFFICE O/P EST MOD 30 MIN: CPT | Performed by: PSYCHIATRY & NEUROLOGY

## 2025-04-14 PROCEDURE — 3078F DIAST BP <80 MM HG: CPT | Performed by: PSYCHIATRY & NEUROLOGY

## 2025-04-14 PROCEDURE — G8420 CALC BMI NORM PARAMETERS: HCPCS | Performed by: PSYCHIATRY & NEUROLOGY

## 2025-04-14 PROCEDURE — 1036F TOBACCO NON-USER: CPT | Performed by: PSYCHIATRY & NEUROLOGY

## 2025-04-14 RX ORDER — LACOSAMIDE 200 MG/1
200 TABLET ORAL 2 TIMES DAILY
Qty: 60 TABLET | Refills: 5 | Status: SHIPPED | OUTPATIENT
Start: 2025-04-14 | End: 2028-04-19

## 2025-04-14 RX ORDER — CLONAZEPAM 1 MG/1
1 TABLET, ORALLY DISINTEGRATING ORAL 2 TIMES DAILY
Qty: 60 TABLET | Refills: 5 | Status: SHIPPED | OUTPATIENT
Start: 2025-04-14 | End: 2028-04-11

## 2025-04-14 RX ORDER — DIAZEPAM 10 MG/100UL
SPRAY NASAL
Qty: 4 EACH | Refills: 1 | Status: SHIPPED | OUTPATIENT
Start: 2025-04-14

## 2025-04-14 RX ORDER — VALPROIC ACID 250 MG/5ML
250 SOLUTION ORAL 2 TIMES DAILY
Qty: 300 ML | Refills: 5 | Status: SHIPPED | OUTPATIENT
Start: 2025-04-14

## 2025-04-14 RX ORDER — CANNABIDIOL 100 MG/ML
320 SOLUTION ORAL 2 TIMES DAILY
Qty: 192 ML | Refills: 5 | Status: ACTIVE | OUTPATIENT
Start: 2025-04-14 | End: 2026-05-20

## 2025-04-14 RX ORDER — CLOBAZAM 2.5 MG/ML
10 SUSPENSION ORAL 2 TIMES DAILY
Qty: 480 ML | Refills: 5 | Status: SHIPPED | OUTPATIENT
Start: 2025-04-14 | End: 2028-04-05

## 2025-04-14 RX ORDER — VALPROIC ACID 250 MG/5ML
375 SOLUTION ORAL NIGHTLY
Qty: 600 ML | Refills: 5 | Status: SHIPPED | OUTPATIENT
Start: 2025-04-14

## 2025-04-14 NOTE — PROGRESS NOTES
[facility] at [facility] at [address] [place] [address] [address] [facility] [address] [place] [address] [phone]              Chief Complaint   Patient presents with    Seizures     Here today with Sophia, caregiver from [facility] // reports no sz activity since Sept   Lennox-Gastaut syndrome  Current antiseizure medications  Epidiolex 100 mg/mL solution--3.2 mL twice daily  Onfi 2.5 mg/mL suspension 4 mL twice daily  Klonopin ODT 1 mg twice daily  Vimpat 200 mg tablets--1 tablet twice daily  Depakene 250 mg/5 mL solution 5 mL 3 times daily and 7.5 mL nightly  Valtoco PRN rescue  VNS       Current Outpatient Medications   Medication Sig Dispense Refill    cannabidiol (EPIDIOLEX) 100 MG/ML oral solution Take 3.2 mLs by mouth in the morning and at bedtime 192 mL 5    diazePAM, 20 MG Dose, (VALTOCO 20 MG DOSE) 2 x 10 MG/0.1ML LQPK USE 1 SPRAY NASALLY AS NEEDED FOR SEIZURE. NO MORE THAN 2 DOSES PER EPISODE,OR 1 EPISODE EVERY 5 DAYS OR 5 EPISODES PER MONTH 4 each 1    NONFORMULARY VNS implant      acetaminophen (TYLENOL) 500 MG tablet Take 2 tablets by mouth every 6 hours as needed for Pain or Fever      Aloe Vera GEL Apply 1 Units topically as needed      bisacodyl (DULCOLAX) 10 MG suppository Place 1 suppository rectally daily as needed      cloBAZam (ONFI) 2.5 MG/ML SUSP suspension 4 mLs by Per G Tube route 2 times daily.      docusate sodium (COLACE) 100 MG capsule Take 1 capsule by mouth daily      polyethylene glycol (GLYCOLAX) 17 g packet Take 1 packet by mouth every 3 days      valproic acid (DEPAKENE) 250 MG/5ML SOLN oral solution Take 7.5 mLs by mouth nightly      Cholecalciferol (VITAMIN D) 50 MCG (2000 UT) CAPS capsule Take 1 capsule by mouth daily      valproic acid (DEPAKENE) 250 MG/5ML SOLN oral solution 5 mLs by Per G Tube route 3 times daily (Patient taking

## 2025-05-08 ENCOUNTER — TELEPHONE (OUTPATIENT)
Age: 35
End: 2025-05-08

## 2025-05-08 NOTE — TELEPHONE ENCOUNTER
Call to report the pt was transported to [facility]  due to having 5 seizures within the same time frame. Please feel free to call with questions.

## 2025-06-24 ENCOUNTER — TELEPHONE (OUTPATIENT)
Age: 35
End: 2025-06-24

## 2025-06-24 NOTE — TELEPHONE ENCOUNTER
Patient's valproic acid level was elevated.  Sissy with the group home will fax over report from ER visit along with labs.    She is asking if the patient will need a medication or dose change.      Thanks!

## 2025-06-30 NOTE — TELEPHONE ENCOUNTER
S/w Deneen with pt's facility.  She reports last valproic acid level was 120.8 (increase).  Per Dr. Wilhelm, he is not concerned with increased level unless she is clinically showing toxicity.  Deneen reports pt losing wgt, decreased appetite, and frequent sz.  Dr. Wilhelm says depakote would do the opposite, INCR wgt, and appetite and DECR sz.  Advised Deneen to contact GI.  She states they will contact pcp as well as GI

## 2025-08-22 DIAGNOSIS — G40.813 INTRACTABLE LENNOX-GASTAUT SYNDROME WITH STATUS EPILEPTICUS (HCC): ICD-10-CM

## 2025-08-26 RX ORDER — CANNABIDIOL 100 MG/ML
SOLUTION ORAL
Qty: 1 ML | Refills: 5 | Status: ACTIVE | OUTPATIENT
Start: 2025-08-26

## (undated) DEVICE — HANDLE LT SNAP ON ULT DURABLE LENS FOR TRUMPF ALC DISPOSABLE

## (undated) DEVICE — CLIP INT SM WIDE RED TI TRNSVRS GRV CHEVRON SHP W PRECIS

## (undated) DEVICE — REM POLYHESIVE ADULT PATIENT RETURN ELECTRODE: Brand: VALLEYLAB

## (undated) DEVICE — INTENDED FOR TISSUE SEPARATION, AND OTHER PROCEDURES THAT REQUIRE A SHARP SURGICAL BLADE TO PUNCTURE OR CUT.: Brand: BARD-PARKER ® CARBON RIB-BACK BLADES

## (undated) DEVICE — APPLICATOR BNDG 1MM ADH PREMIERPRO EXOFIN

## (undated) DEVICE — ROCKER SWITCH PENCIL BLADE ELECTRODE, HOLSTER: Brand: EDGE

## (undated) DEVICE — 3M™ TEGADERM™ TRANSPARENT FILM DRESSING FRAME STYLE, 1626W, 4 IN X 4-3/4 IN (10 CM X 12 CM), 50/CT 4CT/CASE: Brand: 3M™ TEGADERM™

## (undated) DEVICE — KENDALL SCD EXPRESS SLEEVES, KNEE LENGTH, MEDIUM: Brand: KENDALL SCD

## (undated) DEVICE — DRAPE CAM W7XL96IN W/ BLU KRATON TIP FOR LSR VID

## (undated) DEVICE — TOWEL SURG W17XL27IN STD BLU COT NONFENESTRATED PREWASHED

## (undated) DEVICE — SUTURE PROL SZ 4-0 L18IN NONABSORBABLE BLU RB-1 L17MM 1/2 8757H

## (undated) DEVICE — BULB SYRINGE, IRRIGATION WITH PROTECTIVE CAP, 60 CC, INDIVIDUALLY WRAPPED: Brand: DOVER

## (undated) DEVICE — SOLUTION IV 500ML 0.9% SOD CHL FLX CONT

## (undated) DEVICE — PREP SKN PREVAIL 40ML APPL --

## (undated) DEVICE — MEDI-VAC NON-CONDUCTIVE SUCTION TUBING: Brand: CARDINAL HEALTH

## (undated) DEVICE — STRAP POS KNEE BODY VELC

## (undated) DEVICE — SUTURE PROL 5 0 L18IN NONABSORBABLE BLU RB 1 L17MM 1 2 CIR 8756H

## (undated) DEVICE — VESSEL LOOPS,MINI, YELLOW: Brand: DEVON

## (undated) DEVICE — SUTURE PERMAHAND SZ 4-0 L12X30IN NONABSORBABLE BLK SILK A303H

## (undated) DEVICE — 3M™ IOBAN™ 2 ANTIMICROBIAL INCISE DRAPE 6651EZ: Brand: IOBAN™ 2

## (undated) DEVICE — SUTURE MCRYL SZ 4-0 L27IN ABSRB UD L19MM PS-2 1/2 CIR PRIM Y426H

## (undated) DEVICE — YANKAUER BULB TIP, NO VENT: Brand: ARGYLE

## (undated) DEVICE — STERILE POLYISOPRENE POWDER-FREE SURGICAL GLOVES: Brand: PROTEXIS

## (undated) DEVICE — SUT CHRMC 3-0 27IN SH BRN --

## (undated) DEVICE — INFECTION CONTROL KIT SYS

## (undated) DEVICE — SLIM BODY SKIN STAPLER: Brand: APPOSE ULC

## (undated) DEVICE — Device

## (undated) DEVICE — DERMABOND SKIN ADH 0.7ML -- DERMABOND ADVANCED 12/BX

## (undated) DEVICE — DEVON™ KNEE AND BODY STRAP 60" X 3" (1.5 M X 7.6 CM): Brand: DEVON

## (undated) DEVICE — KIT INFECTION CTRL ST FRAN --

## (undated) DEVICE — DRAPE,LAPAROTOMY,PCH,STERILE: Brand: MEDLINE

## (undated) DEVICE — DBD-PACK,LAPAROTOMY,2 REINFORCED GOWNS: Brand: MEDLINE